# Patient Record
Sex: FEMALE | Race: BLACK OR AFRICAN AMERICAN | NOT HISPANIC OR LATINO | Employment: FULL TIME | URBAN - METROPOLITAN AREA
[De-identification: names, ages, dates, MRNs, and addresses within clinical notes are randomized per-mention and may not be internally consistent; named-entity substitution may affect disease eponyms.]

---

## 2018-09-28 ENCOUNTER — OFFICE VISIT (OUTPATIENT)
Dept: PULMONOLOGY | Facility: CLINIC | Age: 43
End: 2018-09-28
Payer: COMMERCIAL

## 2018-09-28 ENCOUNTER — HOSPITAL ENCOUNTER (OUTPATIENT)
Dept: RADIOLOGY | Facility: HOSPITAL | Age: 43
Discharge: HOME | End: 2018-09-28
Attending: INTERNAL MEDICINE
Payer: COMMERCIAL

## 2018-09-28 VITALS
WEIGHT: 228.38 LBS | HEIGHT: 71 IN | SYSTOLIC BLOOD PRESSURE: 130 MMHG | RESPIRATION RATE: 18 BRPM | DIASTOLIC BLOOD PRESSURE: 80 MMHG | BODY MASS INDEX: 31.97 KG/M2 | OXYGEN SATURATION: 98 % | HEART RATE: 77 BPM

## 2018-09-28 DIAGNOSIS — Z91.89 AT RISK FOR OPPORTUNISTIC INFECTIONS: ICD-10-CM

## 2018-09-28 DIAGNOSIS — Z79.52 CURRENT CHRONIC USE OF SYSTEMIC STEROIDS: ICD-10-CM

## 2018-09-28 DIAGNOSIS — D86.9 SARCOIDOSIS: Primary | ICD-10-CM

## 2018-09-28 DIAGNOSIS — D86.9 SARCOIDOSIS: ICD-10-CM

## 2018-09-28 DIAGNOSIS — R22.9 SUBCUTANEOUS NODULES: ICD-10-CM

## 2018-09-28 PROCEDURE — 99999 PR PBB SHADOW E&M-NEW PATIENT-LVL IV: CPT | Mod: PBBFAC,,, | Performed by: INTERNAL MEDICINE

## 2018-09-28 PROCEDURE — 71046 X-RAY EXAM CHEST 2 VIEWS: CPT | Mod: 26,,, | Performed by: RADIOLOGY

## 2018-09-28 PROCEDURE — 71046 X-RAY EXAM CHEST 2 VIEWS: CPT | Mod: TC

## 2018-09-28 PROCEDURE — 99204 OFFICE O/P NEW MOD 45 MIN: CPT | Mod: S$GLB,,, | Performed by: INTERNAL MEDICINE

## 2018-09-28 RX ORDER — PANTOPRAZOLE SODIUM 40 MG/1
40 TABLET, DELAYED RELEASE ORAL DAILY
COMMUNITY

## 2018-09-28 RX ORDER — PREDNISONE 10 MG/1
TABLET ORAL
Qty: 120 TABLET | Refills: 2 | Status: SHIPPED | OUTPATIENT
Start: 2018-09-28 | End: 2018-11-12 | Stop reason: SDUPTHER

## 2018-09-28 RX ORDER — CITALOPRAM 10 MG/1
20 TABLET ORAL
COMMUNITY
End: 2022-07-11 | Stop reason: SDUPTHER

## 2018-09-28 RX ORDER — TIZANIDINE 2 MG/1
4 TABLET ORAL EVERY 6 HOURS PRN
COMMUNITY
End: 2022-07-11

## 2018-09-28 RX ORDER — ATOVAQUONE 750 MG/5ML
750 SUSPENSION ORAL 2 TIMES DAILY
Qty: 100 ML | Refills: 3 | Status: SHIPPED | OUTPATIENT
Start: 2018-09-28 | End: 2018-10-08

## 2018-09-28 RX ORDER — LOSARTAN POTASSIUM AND HYDROCHLOROTHIAZIDE 25; 100 MG/1; MG/1
1 TABLET ORAL DAILY
COMMUNITY

## 2018-09-28 RX ORDER — ATORVASTATIN CALCIUM 20 MG/1
20 TABLET, FILM COATED ORAL DAILY
COMMUNITY

## 2018-09-28 NOTE — PROGRESS NOTES
Initial Outpatient Pulmonary Evaluation       SUBJECTIVE:     History of Present Illness:  Patient is a 42 y.o. female with known history of sarcoidosis presenting for evaluation.  Patient has had a bronchoscopy and also mediastinal lymph node biopsy apparently because she has a scar at the lower neck in 2009 and diagnosed with sarcoidosis.  She had in the past abnormal chest x-ray shortness of breath coughing and subcutaneous nodules mainly in her lower extremities she was treated with prednisone for about 4 or 5 years and as of 2014 she stopped prednisone and she was doing well.    About a month ago the patient started noticing shortness of breath, no cough no wheezing, she also noticed again subcutaneous nodules in bilateral lower extremities.      Never smoker.    Chief Complaint   Patient presents with    Sarcoidosis       Review of patient's allergies indicates:   Allergen Reactions    Sulfa (sulfonamide antibiotics) Itching and Swelling    Phenytoin sodium extended Other (See Comments)       Current Outpatient Medications   Medication Sig Dispense Refill    atorvastatin (LIPITOR) 20 MG tablet Take 20 mg by mouth once daily.      citalopram (CELEXA) 20 MG tablet Take 20 mg by mouth.      losartan-hydrochlorothiazide 100-25 mg (HYZAAR) 100-25 mg per tablet Take 1 tablet by mouth once daily.      pantoprazole (PROTONIX) 40 MG tablet Take 40 mg by mouth once daily.      tiZANidine (ZANAFLEX) 2 MG tablet Take 4 mg by mouth every 6 (six) hours as needed.      atovaquone (MEPRON) 750 mg/5 mL Susp Take 5 mLs (750 mg total) by mouth 2 (two) times daily. for 10 days 100 mL 3    predniSONE (DELTASONE) 10 MG tablet Prednisone 40 mg daily for 4 weeks 120 tablet 2     No current facility-administered medications for this visit.        Past Medical History:   Diagnosis Date    Arthritis     Cutaneous sarcoidosis     Hypertension 6/19/2013    Sarcoidosis      "Sarcoidosis of lung      Past Surgical History:   Procedure Laterality Date    mediastinoscopy       Family History   Problem Relation Age of Onset    Sarcoidosis Brother      Social History     Tobacco Use    Smoking status: Never Smoker    Smokeless tobacco: Never Used   Substance Use Topics    Alcohol use: No    Drug use: No        Review of Systems:  Review of Systems   Constitutional: Positive for fatigue.   HENT: Negative for nosebleeds.    Eyes: Negative for discharge.   Respiratory: Positive for shortness of breath. Negative for cough and wheezing.    Cardiovascular: Negative for chest pain and palpitations.   Gastrointestinal: Negative for abdominal pain.   Endocrine: Negative for polydipsia.   Genitourinary: Negative for hematuria.   Musculoskeletal: Positive for arthralgias and back pain.   Skin: Negative for wound.        Lower extremity subcutaneous nodules   Allergic/Immunologic: Positive for immunocompromised state.        Patient will be started on prednisone   Neurological: Negative for seizures.   Hematological: Positive for adenopathy.   Psychiatric/Behavioral: Negative for behavioral problems and confusion.       OBJECTIVE:     Vital Signs (Most Recent)  Pulse: 77 (09/28/18 0813)  Resp: 18 (09/28/18 0813)  BP: 130/80 (09/28/18 0813)  SpO2: 98 % (09/28/18 0813)  5' 11" (1.803 m)  103.6 kg (228 lb 6.3 oz)     Physical Exam:  Physical Exam   Constitutional: She appears well-developed and well-nourished.   HENT:   Head: Normocephalic and atraumatic.   Eyes: EOM are normal.   Neck: Neck supple.   Cardiovascular: Normal rate and regular rhythm.   Pulmonary/Chest: Effort normal. No respiratory distress. She has no wheezes. She has no rales.   Breath sound diminished mildly bilaterally   Abdominal: Soft. There is no tenderness.       Laboratory    Lab Results   Component Value Date    WBC 5.05 01/05/2012    HGB 13.7 01/05/2012    HCT 39.8 01/05/2012    MCV 86.1 01/05/2012     01/05/2012 "     BMP  Lab Results   Component Value Date     01/05/2012    K 4.1 01/05/2012     01/05/2012    CO2 27 01/05/2012    BUN 13 01/05/2012    CREATININE 0.8 01/05/2012    CALCIUM 8.6 (L) 01/05/2012    ANIONGAP 6.0 (L) 01/05/2012    ESTGFRAFRICA >60 01/05/2012    EGFRNONAA >60 01/05/2012     BNP  @LABRCNTIP(BNP,BNPTRIAGEBLO)@  No results found for: TSH  ABG  @LABRCNTIP(PH,PO2,PCO2,HCO3,BE)@     Diagnostic Results:  X-Ray: Reviewed  Chest x-ray 2013 personally reviewed normal lungs.      Ace level in May 2018 was 54, Ace level in September 2018 was 81.    6 min walk test 2013 showed mild reduction of exercise capacity, no significant desaturation    PFT 2013 personally reviewed within normal.      ASSESSMENT/PLAN:     1. Sarcoidosis    2. Subcutaneous nodules    3. At risk for opportunistic infections    4. Current chronic use of systemic steroids        Start prednisone 40 mg daily for 4 weeks.    Patient has allergy to Bactrim with itching and swelling in her mouth, will start Atovaquone 750 mg twice daily with meals.    Check chest x-ray, PFT with bronchodilator, 6 min walking test.  Check ESR    Referred to Rheumatology.      +++ Hx lung aspergillus   Follow-up in about 4 weeks (around 10/26/2018).    This note was prepared using voice recognition system and is likely to have sound alike errors that may have been overlooked even after proof reading.  Please call me with any questions    Discussed diagnosis, its evaluation, treatment and usual course. All questions answered.    Thank you for the courtesy of participating in the care of this patient    Alexander Cheng MD

## 2018-09-29 ENCOUNTER — PATIENT MESSAGE (OUTPATIENT)
Dept: PULMONOLOGY | Facility: CLINIC | Age: 43
End: 2018-09-29

## 2018-10-03 ENCOUNTER — INITIAL CONSULT (OUTPATIENT)
Dept: RHEUMATOLOGY | Facility: CLINIC | Age: 43
End: 2018-10-03
Payer: COMMERCIAL

## 2018-10-03 ENCOUNTER — LAB VISIT (OUTPATIENT)
Dept: LAB | Facility: HOSPITAL | Age: 43
End: 2018-10-03
Attending: STUDENT IN AN ORGANIZED HEALTH CARE EDUCATION/TRAINING PROGRAM
Payer: COMMERCIAL

## 2018-10-03 VITALS
DIASTOLIC BLOOD PRESSURE: 80 MMHG | BODY MASS INDEX: 31.82 KG/M2 | WEIGHT: 227.31 LBS | HEIGHT: 71 IN | HEART RATE: 83 BPM | SYSTOLIC BLOOD PRESSURE: 118 MMHG

## 2018-10-03 DIAGNOSIS — D86.9 SARCOID: Primary | ICD-10-CM

## 2018-10-03 DIAGNOSIS — D86.9 SARCOID: ICD-10-CM

## 2018-10-03 LAB
ALBUMIN SERPL BCP-MCNC: 3.6 G/DL
ALP SERPL-CCNC: 86 U/L
ALT SERPL W/O P-5'-P-CCNC: 18 U/L
ANION GAP SERPL CALC-SCNC: 10 MMOL/L
AST SERPL-CCNC: 18 U/L
BASOPHILS # BLD AUTO: 0.02 K/UL
BASOPHILS NFR BLD: 0.4 %
BILIRUB SERPL-MCNC: 0.7 MG/DL
BUN SERPL-MCNC: 19 MG/DL
CALCIUM SERPL-MCNC: 9.4 MG/DL
CHLORIDE SERPL-SCNC: 106 MMOL/L
CO2 SERPL-SCNC: 24 MMOL/L
CREAT SERPL-MCNC: 1 MG/DL
CRP SERPL-MCNC: 4.2 MG/L
DIFFERENTIAL METHOD: ABNORMAL
EOSINOPHIL # BLD AUTO: 0 K/UL
EOSINOPHIL NFR BLD: 0.6 %
ERYTHROCYTE [DISTWIDTH] IN BLOOD BY AUTOMATED COUNT: 12.2 %
ERYTHROCYTE [SEDIMENTATION RATE] IN BLOOD BY WESTERGREN METHOD: 17 MM/HR
EST. GFR  (AFRICAN AMERICAN): >60 ML/MIN/1.73 M^2
EST. GFR  (NON AFRICAN AMERICAN): >60 ML/MIN/1.73 M^2
GLUCOSE SERPL-MCNC: 107 MG/DL
HCT VFR BLD AUTO: 36.8 %
HGB BLD-MCNC: 12.9 G/DL
LYMPHOCYTES # BLD AUTO: 1.1 K/UL
LYMPHOCYTES NFR BLD: 22.9 %
MCH RBC QN AUTO: 29.8 PG
MCHC RBC AUTO-ENTMCNC: 35.1 G/DL
MCV RBC AUTO: 85 FL
MONOCYTES # BLD AUTO: 0.5 K/UL
MONOCYTES NFR BLD: 11.5 %
NEUTROPHILS # BLD AUTO: 3 K/UL
NEUTROPHILS NFR BLD: 64.6 %
PLATELET # BLD AUTO: 224 K/UL
PMV BLD AUTO: 10.9 FL
POTASSIUM SERPL-SCNC: 3.1 MMOL/L
PROT SERPL-MCNC: 7.1 G/DL
RBC # BLD AUTO: 4.33 M/UL
SODIUM SERPL-SCNC: 140 MMOL/L
WBC # BLD AUTO: 4.71 K/UL

## 2018-10-03 PROCEDURE — 80074 ACUTE HEPATITIS PANEL: CPT

## 2018-10-03 PROCEDURE — 99999 PR PBB SHADOW E&M-EST. PATIENT-LVL III: CPT | Mod: PBBFAC,,, | Performed by: STUDENT IN AN ORGANIZED HEALTH CARE EDUCATION/TRAINING PROGRAM

## 2018-10-03 PROCEDURE — 80053 COMPREHEN METABOLIC PANEL: CPT

## 2018-10-03 PROCEDURE — 99204 OFFICE O/P NEW MOD 45 MIN: CPT | Mod: S$GLB,,, | Performed by: STUDENT IN AN ORGANIZED HEALTH CARE EDUCATION/TRAINING PROGRAM

## 2018-10-03 PROCEDURE — 36415 COLL VENOUS BLD VENIPUNCTURE: CPT | Mod: PO

## 2018-10-03 PROCEDURE — 85025 COMPLETE CBC W/AUTO DIFF WBC: CPT | Mod: PO

## 2018-10-03 PROCEDURE — 85651 RBC SED RATE NONAUTOMATED: CPT | Mod: PO

## 2018-10-03 PROCEDURE — 86140 C-REACTIVE PROTEIN: CPT

## 2018-10-03 NOTE — PROGRESS NOTES
"     RHEUMATOLOGY OUTPATIENT CLINIC NOTE    10/3/2018    Attending Rheumatologist: Susanne Villalobos  Primary Care Provider: Primary Doctor No   Physician Requesting Consultation: Alexander Cheng MD  [address] [place] [address]  Chief Complaint/Reason For Consultation:  sarcoid    Subjective:       HPI  Henrietta Parada is a 43 y.o. Black or  female with     -Initially presented with sob and cough. Hx aspergillosus. Abnormal cxr led to bronchoscopy w diagnostic mediastinal lymph node biopsy consistent w sarcoid. Also had subcutaneous nodules on LE mostly, but also on forearms.   -Treated with prednisone approx 5 years, off since 2014.   -Was doing well until approx 1 month ago when she started developing sob and subcutaneous nodules in lower extremities. Also developed nodules on tattoos which is new.   -Previously tried mtx: stopped 2.2 "developed nodules in lungs?" Does not recall how long she had been on or how many tabs.  -Recently evaluated by cardiology at [facility] 2.2 new onset palpitations, chest pain, sob. EKG and echo done per pt, results unknown.   -No hx uveitis, numbness/tingling, fevers, weightloss, night sweats, cough, abdominal pain, swollen joints.   -+Dry eyes    -14pt ros negative except as otherwise stated above      Review of Systems   All other systems reviewed and are negative.      Chronic comorbid conditions affecting medical decision making today:  Past Medical History:   Diagnosis Date    Arthritis     Cutaneous sarcoidosis     Hypertension 6/19/2013    Sarcoidosis     Sarcoidosis of lung      Past Surgical History:   Procedure Laterality Date    mediastinoscopy       Family History   Problem Relation Age of Onset    Sarcoidosis Brother      Social History     Substance and Sexual Activity   Alcohol Use No     Social History     Tobacco Use   Smoking Status Never Smoker   Smokeless Tobacco Never Used     Social History     Substance and Sexual " Activity   Drug Use No       Current Outpatient Medications:     atorvastatin (LIPITOR) 20 MG tablet, Take 20 mg by mouth once daily., Disp: , Rfl:     atovaquone (MEPRON) 750 mg/5 mL Susp, Take 5 mLs (750 mg total) by mouth 2 (two) times daily. for 10 days, Disp: 100 mL, Rfl: 3    citalopram (CELEXA) 20 MG tablet, Take 20 mg by mouth., Disp: , Rfl:     losartan-hydrochlorothiazide 100-25 mg (HYZAAR) 100-25 mg per tablet, Take 1 tablet by mouth once daily., Disp: , Rfl:     pantoprazole (PROTONIX) 40 MG tablet, Take 40 mg by mouth once daily., Disp: , Rfl:     predniSONE (DELTASONE) 10 MG tablet, Prednisone 40 mg daily for 4 weeks, Disp: 120 tablet, Rfl: 2    tiZANidine (ZANAFLEX) 2 MG tablet, Take 4 mg by mouth every 6 (six) hours as needed., Disp: , Rfl:        Objective:         There were no vitals filed for this visit.  Physical Exam   Constitutional: She is oriented to person, place, and time and well-developed, well-nourished, and in no distress. No distress.   HENT:   Head: Normocephalic and atraumatic.   Eyes: Conjunctivae and EOM are normal. Pupils are equal, round, and reactive to light.   Mild lacrimal enlargement   Neck: Normal range of motion. Neck supple. No thyromegaly present.   Cardiovascular: Normal rate, regular rhythm and normal heart sounds.    Pulmonary/Chest: Effort normal and breath sounds normal. No respiratory distress.   Neurological: She is alert and oriented to person, place, and time. Gait normal.   Skin: Skin is warm and dry.     Scattered nodules across shins, .5cm x .5cm. +sxcoritations.   Small nodules along tattoo ink on left upper arm       Musculoskeletal: Normal range of motion. She exhibits no edema or tenderness.         Reviewed old and all outside pertinent medical records available.    All lab results personally reviewed and interpreted by me.  Lab Results   Component Value Date    WBC 5.05 01/05/2012    HGB 13.7 01/05/2012    HCT 39.8 01/05/2012    MCV 86.1  01/05/2012    MCH 29.7 01/05/2012    MCHC 34.4 01/05/2012    RDW 12.4 01/05/2012     01/05/2012    MPV 12.6 01/05/2012    PLTEST Adequate 05/21/2010       Lab Results   Component Value Date     01/05/2012    K 4.1 01/05/2012     01/05/2012    CO2 27 01/05/2012    GLU 85 01/05/2012    BUN 13 01/05/2012    CALCIUM 8.6 (L) 01/05/2012    PROT 6.8 01/05/2012    ALBUMIN 3.6 01/05/2012    BILITOT 0.4 01/05/2012    AST 15 01/05/2012    ALKPHOS 74 01/05/2012    ALT 16 01/05/2012       No results found for: COLORU, APPEARANCEUA, SPECGRAV, PHUR, PROTEINUA, GLUCOSEU, KETONESU, BLOODU, LEUKOCYTESUR, NITRITE, UROBILINOGEN    No results found for: CRP    Lab Results   Component Value Date    SEDRATE 21 (H) 09/28/2018       Lab Results   Component Value Date    SEDRATE 21 (H) 09/28/2018       No components found for: 25OHVITDTOT, 98SXDGBI1, 81DQFNIE3, METHODNOTE    No results found for: URICACID    No components found for: TSPOTTB    ESR 21  ACE 73    Imaging:  All imaging reviewed and independently  interpreted by me.  CXR 9/28/18  FINDINGS:  The lungs are clear and free of infiltrate.  No pleural effusion or pneumothorax. The heart is not enlarged.      Impression       1.  No acute cardiopulmonary process.          ASSESSMENT / PLAN:     Henrietta Parada is a 43 y.o. Black or  female with:    1. Sarcoid  -Initially diagnosed around 2009 via mediastinal lymph node biopsy c/w sarcoid  -Hx pulmonary and cutaneous involvement ; treated successfully w prednisone x 5 years, off since 2014  -Hx mtx: reports stopped after she began developing nodules (?)   -Now again w sob + cutaneous nodules x 1 month as before, as well as new nodules along tattoo , which she did not develop previously  -Evaluated by pulm, cxr not suggestive of active sarcoid. Has upcoming PFTs anf f/u appt 10/12/18  -Currently being evaluated by cardiology at [facility] 2.2 new onset palpitations and chest pain. Will get records of EKG for  evidence of arrythmias. Will need evaluation of possible cardiac involvement and assess need for cardiac MRI, given likely active cutaneous involvement  -Refer to dermatology for cutaneous lesions   -Currently on prednisone 40mg per pulmonary until rtc. Reports lesions improving. At this point, appears limited cutaneous involvement.   -Further escalation of therapy / initiation systemic steoid sparing agent (ie plaquenil) will depend on organ involvement vs limited cutaneous involvement. Reassess at rtc    There are no diagnoses linked to this encounter.    . Other specified counseling  - Immunization counseling done  - Weight loss counseling done  - Nutrition and exercise counseling  - Medication counseling provided      F/u 6 weeks    Method of contact with patient concerns: Rj attn Rheumatology      Susanne Villalobos M.D.  Rheumatology Department   [facility] - [address] [address]  Phone: [phone]  Fax: [phone]

## 2018-10-03 NOTE — LETTER
October 3, 2018      Alexander Cheng MD  [address] [place] [address] [address] [place] [address]           [facility] - Rheumatology  [address] [place] [address]  Phone: [phone]  Fax: [phone]          Patient: Henrietta Parada   MR Number: [id number]   YOB: 1975   Date of Visit: 10/3/2018       Dear Dr. Alexander Cheng:    Thank you for referring Henrietta Parada to me for evaluation. Attached you will find relevant portions of my assessment and plan of care.    If you have questions, please do not hesitate to call me. I look forward to following Henrietta Parada along with you.    Sincerely,    Susanne Villalobos MD    Enclosure  CC:  No Recipients    If you would like to receive this communication electronically, please contact [email or web address] or [phone] to request more information on [employer] access.    For providers and/or their staff who would like to refer a patient to [facility], please contact us through our one-stop-shop provider referral line, [facility], at [phone].    If you feel you have received this communication in error or would no longer like to receive these types of communications, please e-mail [email or web address]

## 2018-10-04 LAB
HAV IGM SERPL QL IA: NEGATIVE
HBV CORE IGM SERPL QL IA: NEGATIVE
HBV SURFACE AG SERPL QL IA: NEGATIVE
HCV AB SERPL QL IA: NEGATIVE

## 2018-10-08 ENCOUNTER — PATIENT MESSAGE (OUTPATIENT)
Dept: RHEUMATOLOGY | Facility: CLINIC | Age: 43
End: 2018-10-08

## 2018-10-08 ENCOUNTER — TELEPHONE (OUTPATIENT)
Dept: RHEUMATOLOGY | Facility: CLINIC | Age: 43
End: 2018-10-08

## 2018-10-08 NOTE — TELEPHONE ENCOUNTER
Called pt as requested in pt portal msgs, Pt stated Dr. Villalobos wanted her to see Cardiologist ASAP but first available 10/26/18 per pt. Advised to keep appt and get on wait list for earlier appt,  if possible. Pt verbalized understanding

## 2018-10-08 NOTE — TELEPHONE ENCOUNTER
Spoke with pt and she states that she received a call earlier from Dr. MARIN's nurse and everything was taken care of.

## 2018-10-18 ENCOUNTER — PATIENT MESSAGE (OUTPATIENT)
Dept: PULMONOLOGY | Facility: CLINIC | Age: 43
End: 2018-10-18

## 2018-10-18 ENCOUNTER — PATIENT MESSAGE (OUTPATIENT)
Dept: RHEUMATOLOGY | Facility: CLINIC | Age: 43
End: 2018-10-18

## 2018-11-05 ENCOUNTER — PATIENT MESSAGE (OUTPATIENT)
Dept: PULMONOLOGY | Facility: CLINIC | Age: 43
End: 2018-11-05

## 2018-11-12 ENCOUNTER — OFFICE VISIT (OUTPATIENT)
Dept: PULMONOLOGY | Facility: CLINIC | Age: 43
End: 2018-11-12
Payer: COMMERCIAL

## 2018-11-12 ENCOUNTER — PATIENT MESSAGE (OUTPATIENT)
Dept: RHEUMATOLOGY | Facility: CLINIC | Age: 43
End: 2018-11-12

## 2018-11-12 ENCOUNTER — CLINICAL SUPPORT (OUTPATIENT)
Dept: PULMONOLOGY | Facility: CLINIC | Age: 43
End: 2018-11-12
Payer: COMMERCIAL

## 2018-11-12 VITALS
OXYGEN SATURATION: 98 % | HEART RATE: 76 BPM | WEIGHT: 235 LBS | DIASTOLIC BLOOD PRESSURE: 92 MMHG | RESPIRATION RATE: 18 BRPM | HEIGHT: 71 IN | BODY MASS INDEX: 32.9 KG/M2 | SYSTOLIC BLOOD PRESSURE: 140 MMHG

## 2018-11-12 DIAGNOSIS — Z91.89 AT RISK FOR OPPORTUNISTIC INFECTIONS: ICD-10-CM

## 2018-11-12 DIAGNOSIS — D86.9 SARCOIDOSIS: Primary | ICD-10-CM

## 2018-11-12 DIAGNOSIS — Z79.52 CURRENT CHRONIC USE OF SYSTEMIC STEROIDS: ICD-10-CM

## 2018-11-12 DIAGNOSIS — R22.9 SUBCUTANEOUS NODULES: ICD-10-CM

## 2018-11-12 DIAGNOSIS — D86.9 SARCOIDOSIS: ICD-10-CM

## 2018-11-12 DIAGNOSIS — Z23 NEED FOR VACCINATION WITH 13-POLYVALENT PNEUMOCOCCAL CONJUGATE VACCINE: ICD-10-CM

## 2018-11-12 PROCEDURE — 90670 PCV13 VACCINE IM: CPT | Mod: S$GLB,,, | Performed by: INTERNAL MEDICINE

## 2018-11-12 PROCEDURE — 99999 PR PBB SHADOW E&M-EST. PATIENT-LVL III: CPT | Mod: PBBFAC,,, | Performed by: INTERNAL MEDICINE

## 2018-11-12 PROCEDURE — 94060 EVALUATION OF WHEEZING: CPT | Mod: S$GLB,,, | Performed by: INTERNAL MEDICINE

## 2018-11-12 PROCEDURE — 99214 OFFICE O/P EST MOD 30 MIN: CPT | Mod: 25,S$GLB,, | Performed by: INTERNAL MEDICINE

## 2018-11-12 PROCEDURE — 90471 IMMUNIZATION ADMIN: CPT | Mod: S$GLB,,, | Performed by: INTERNAL MEDICINE

## 2018-11-12 RX ORDER — MELOXICAM 15 MG/1
15 TABLET ORAL DAILY
Refills: 3 | COMMUNITY
Start: 2018-10-24 | End: 2022-11-29

## 2018-11-12 RX ORDER — PREDNISONE 10 MG/1
TABLET ORAL
Qty: 120 TABLET | Refills: 2 | Status: SHIPPED | OUTPATIENT
Start: 2018-11-12 | End: 2020-07-20

## 2018-11-12 RX ORDER — ATOVAQUONE 750 MG/5ML
750 SUSPENSION ORAL 2 TIMES DAILY
Qty: 210 ML | Refills: 1 | Status: SHIPPED | OUTPATIENT
Start: 2018-11-12 | End: 2018-12-20

## 2018-11-12 NOTE — PROGRESS NOTES
Pulmonary Outpatient Follow Up Visit     Subjective:      Patient ID: Henrietta Parada is a 43 y.o. female.    Chief Complaint: Sarcoidosis    HPI   43-year-old female patient presenting for 4 weeks follow-up.    Denies coughing denies wheezing denies chest pain.  Today she states she does not have shortness of breath.    Her lower extremity subcutaneous nodules have resolved 2 weeks after starting her on prednisone.  She was initially started on 40 mg daily but complained of tremor I decreased the dosage  to 30 mg daily.    She is following with Dr. GILLIAM in [facility], she has a cardiac MRI to rule out cardiac sarcoidosis scheduled November 2018.    About a month prior to her initial presentation to see me, the patient started noticing shortness of breath, no cough no wheezing, she also noticed again subcutaneous nodules in bilateral lower extremities.    She has had a bronchoscopy and also mediastinal lymph node biopsy apparently because she has a scar at the lower neck in 2009 and diagnosed with sarcoidosis.  She had in the past abnormal chest x-ray, shortness of breath , cough and subcutaneous nodules mainly in her lower extremities and she was treated with prednisone for about 4 or 5 years and as of 2014 she stopped prednisone and she was doing well.     Never smoker.    Review of Systems   Constitutional: Positive for fatigue.   HENT: Negative for nosebleeds.    Respiratory: Positive for shortness of breath. Negative for cough.    Genitourinary: Negative for hematuria.   Musculoskeletal: Positive for arthralgias and back pain.   Skin:        Lower extremity subcutaneous nodules    Gastrointestinal: Negative for abdominal pain.   Neurological: Negative for syncope.   Hematological: Positive for adenopathy.   Psychiatric/Behavioral: The patient is not nervous/anxious.        Outpatient Encounter Medications as of 11/12/2018   Medication Sig Dispense Refill     "atorvastatin (LIPITOR) 20 MG tablet Take 20 mg by mouth once daily.      citalopram (CELEXA) 20 MG tablet Take 20 mg by mouth.      losartan-hydrochlorothiazide 100-25 mg (HYZAAR) 100-25 mg per tablet Take 1 tablet by mouth once daily.      meloxicam (MOBIC) 15 MG tablet Take 15 mg by mouth once daily.  3    pantoprazole (PROTONIX) 40 MG tablet Take 40 mg by mouth once daily.      predniSONE (DELTASONE) 10 MG tablet Prednisone 20 mg daily for 6 weeks then 10 mg daily 120 tablet 2    tiZANidine (ZANAFLEX) 2 MG tablet Take 4 mg by mouth every 6 (six) hours as needed.      [DISCONTINUED] predniSONE (DELTASONE) 10 MG tablet Prednisone 40 mg daily for 4 weeks 120 tablet 2    atovaquone (MEPRON) 750 mg/5 mL Susp Take 5 mLs (750 mg total) by mouth 2 (two) times daily. 210 mL 1     No facility-administered encounter medications on file as of 11/12/2018.      Objective:     Vital Signs (Most Recent)  Vital Signs  Pulse: 76  Resp: 18  SpO2: 98 %  BP: (!) 140/92  Height and Weight  Height: 5' 11" (180.3 cm)  Weight: 106.6 kg (235 lb)  BSA (Calculated - sq m): 2.31 sq meters  BMI (Calculated): 32.8  Weight in (lb) to have BMI = 25: 178.9]  Wt Readings from Last 3 Encounters:   11/12/18 106.6 kg (235 lb)   10/03/18 103.1 kg (227 lb 4.7 oz)   09/28/18 103.6 kg (228 lb 6.3 oz)     Temp Readings from Last 3 Encounters:   No data found for Temp     Height: 5' 11" (180.3 cm)          Physical Exam   Constitutional: She is oriented to person, place, and time. She appears well-developed and well-nourished.   HENT:   Head: Normocephalic.   Neck: Neck supple.   Cardiovascular: Normal rate and regular rhythm.   Pulmonary/Chest: Normal expansion and effort normal. No respiratory distress. She has decreased breath sounds. She has no wheezes.   Abdominal: Soft.   Musculoskeletal: She exhibits no edema.   Lymphadenopathy:     She has no cervical adenopathy.   Neurological: She is alert and oriented to person, place, and time.   Skin: " Skin is warm.   Lower extremity subcutaneous nodules resolved.   Psychiatric: She has a normal mood and affect. Her behavior is normal. Judgment and thought content normal.     Laboratory    Lab Results   Component Value Date    WBC 4.71 10/03/2018    HGB 12.9 10/03/2018    HCT 36.8 (L) 10/03/2018    MCV 85 10/03/2018     10/03/2018     BMP  Lab Results   Component Value Date     10/03/2018    K 3.1 (L) 10/03/2018     10/03/2018    CO2 24 10/03/2018    BUN 19 10/03/2018    CREATININE 1.0 10/03/2018    CALCIUM 9.4 10/03/2018    ANIONGAP 10 10/03/2018    ESTGFRAFRICA >60.0 10/03/2018    EGFRNONAA >60.0 10/03/2018     ACE level in May 2018 was 54, Ace level in September 2018 was 81.  Tests were done at [facility].    CRP , ESR , HEPATITIS PANEL, QuantiFERON TB gold normal.    Diagnostic Results:  Spirometry done today personally reviewed.  No obstruction or restriction noted.  There was slight decrease of FEV1 and FVC when compared to PFT from 2013.    Chest x-ray September 28, 2018 personally reviewed    Clear lungs.    Chest x-ray 2013 personally reviewed normal lungs.     6 min walk test 2013 showed mild reduction of exercise capacity, no significant desaturation     PFT 2013 personally reviewed within normal.     Assessment/Plan:   Sarcoidosis  -     predniSONE (DELTASONE) 10 MG tablet; Prednisone 20 mg daily for 6 weeks then 10 mg daily  Dispense: 120 tablet; Refill: 2  -     Complete PFT with bronchodilator; Future; Expected date: 02/12/2019    Subcutaneous nodules    At risk for opportunistic infections  -     atovaquone (MEPRON) 750 mg/5 mL Susp; Take 5 mLs (750 mg total) by mouth 2 (two) times daily.  Dispense: 210 mL; Refill: 1    Current chronic use of systemic steroids  -     atovaquone (MEPRON) 750 mg/5 mL Susp; Take 5 mLs (750 mg total) by mouth 2 (two) times daily.  Dispense: 210 mL; Refill: 1    Need for vaccination with 13-polyvalent pneumococcal conjugate vaccine  -      Pneumococcal Conjugate Vaccine (13 Valent) (IM)      Decrease prednisone to 20 mg daily for 6 weeks then 10 mg daily until RTC.  Continue PCP prophylaxis until the patient is on less than 15 mg daily of prednisone.    Will monitor also patient weight, there has been an increase of 7 lb compared to last visit.    Will reassess the patient lung function in 3 months from now.    Will follow on the patient cardiac MRI.    Patient did not perform 6 min walking test today due to elevated diastolic blood pressure, she will monitor her blood pressure at home and follow up with PMD regarding BP control.    Follow-up in about 3 months (around 2/12/2019).    This note was prepared using voice recognition system and is likely to have sound alike errors that may have been overlooked even after proof reading.  Please call me with any questions    Discussed diagnosis, its evaluation, treatment and usual course. All questions answered.    Thank you for the courtesy of participating in the care of this patient    Alexander Cheng MD

## 2018-11-13 ENCOUNTER — PATIENT MESSAGE (OUTPATIENT)
Dept: RHEUMATOLOGY | Facility: CLINIC | Age: 43
End: 2018-11-13

## 2018-11-13 NOTE — TELEPHONE ENCOUNTER
Pt scheduled for Cardiac MRI on Friday afternoon @ [facility], and just wondering if her f/u visit is based on those results?   Would like to know so she can reschedule your appt from tomorrow to sometime next week.    Please advise,  Thx

## 2018-11-21 ENCOUNTER — LAB VISIT (OUTPATIENT)
Dept: LAB | Facility: HOSPITAL | Age: 43
End: 2018-11-21
Attending: STUDENT IN AN ORGANIZED HEALTH CARE EDUCATION/TRAINING PROGRAM
Payer: COMMERCIAL

## 2018-11-21 ENCOUNTER — PATIENT MESSAGE (OUTPATIENT)
Dept: RHEUMATOLOGY | Facility: CLINIC | Age: 43
End: 2018-11-21

## 2018-11-21 ENCOUNTER — OFFICE VISIT (OUTPATIENT)
Dept: RHEUMATOLOGY | Facility: CLINIC | Age: 43
End: 2018-11-21
Payer: COMMERCIAL

## 2018-11-21 VITALS
HEART RATE: 86 BPM | BODY MASS INDEX: 32.41 KG/M2 | WEIGHT: 231.5 LBS | DIASTOLIC BLOOD PRESSURE: 89 MMHG | SYSTOLIC BLOOD PRESSURE: 125 MMHG | HEIGHT: 71 IN

## 2018-11-21 DIAGNOSIS — D86.9 SARCOIDOSIS: ICD-10-CM

## 2018-11-21 LAB
ALBUMIN SERPL BCP-MCNC: 3.7 G/DL
ALP SERPL-CCNC: 60 U/L
ALT SERPL W/O P-5'-P-CCNC: 636 U/L
ANION GAP SERPL CALC-SCNC: 6 MMOL/L
AST SERPL-CCNC: 199 U/L
BASOPHILS # BLD AUTO: 0.03 K/UL
BASOPHILS NFR BLD: 0.4 %
BILIRUB SERPL-MCNC: 1 MG/DL
BUN SERPL-MCNC: 28 MG/DL
CALCIUM SERPL-MCNC: 9.7 MG/DL
CHLORIDE SERPL-SCNC: 101 MMOL/L
CO2 SERPL-SCNC: 34 MMOL/L
CREAT SERPL-MCNC: 1 MG/DL
CRP SERPL-MCNC: 3.2 MG/L
DIFFERENTIAL METHOD: NORMAL
EOSINOPHIL # BLD AUTO: 0 K/UL
EOSINOPHIL NFR BLD: 0.3 %
ERYTHROCYTE [DISTWIDTH] IN BLOOD BY AUTOMATED COUNT: 13.4 %
ERYTHROCYTE [SEDIMENTATION RATE] IN BLOOD BY WESTERGREN METHOD: 5 MM/HR
EST. GFR  (AFRICAN AMERICAN): >60 ML/MIN/1.73 M^2
EST. GFR  (NON AFRICAN AMERICAN): >60 ML/MIN/1.73 M^2
GLUCOSE SERPL-MCNC: 96 MG/DL
HCT VFR BLD AUTO: 43.7 %
HGB BLD-MCNC: 14.8 G/DL
LYMPHOCYTES # BLD AUTO: 2.6 K/UL
LYMPHOCYTES NFR BLD: 39.1 %
MCH RBC QN AUTO: 30.3 PG
MCHC RBC AUTO-ENTMCNC: 33.9 G/DL
MCV RBC AUTO: 90 FL
MONOCYTES # BLD AUTO: 0.8 K/UL
MONOCYTES NFR BLD: 12.2 %
NEUTROPHILS # BLD AUTO: 3.2 K/UL
NEUTROPHILS NFR BLD: 48 %
PLATELET # BLD AUTO: 209 K/UL
PMV BLD AUTO: 11.2 FL
POTASSIUM SERPL-SCNC: 3.6 MMOL/L
PROT SERPL-MCNC: 7.1 G/DL
RBC # BLD AUTO: 4.88 M/UL
SODIUM SERPL-SCNC: 141 MMOL/L
WBC # BLD AUTO: 6.73 K/UL

## 2018-11-21 PROCEDURE — 99214 OFFICE O/P EST MOD 30 MIN: CPT | Mod: S$GLB,,, | Performed by: STUDENT IN AN ORGANIZED HEALTH CARE EDUCATION/TRAINING PROGRAM

## 2018-11-21 PROCEDURE — 99999 PR PBB SHADOW E&M-EST. PATIENT-LVL III: CPT | Mod: PBBFAC,,, | Performed by: STUDENT IN AN ORGANIZED HEALTH CARE EDUCATION/TRAINING PROGRAM

## 2018-11-21 PROCEDURE — 36415 COLL VENOUS BLD VENIPUNCTURE: CPT | Mod: PO

## 2018-11-21 PROCEDURE — 85651 RBC SED RATE NONAUTOMATED: CPT | Mod: PO

## 2018-11-21 PROCEDURE — 85025 COMPLETE CBC W/AUTO DIFF WBC: CPT | Mod: PO

## 2018-11-21 PROCEDURE — 80053 COMPREHEN METABOLIC PANEL: CPT

## 2018-11-21 PROCEDURE — 86140 C-REACTIVE PROTEIN: CPT

## 2018-11-21 NOTE — PROGRESS NOTES
"     RHEUMATOLOGY OUTPATIENT CLINIC NOTE    11/21/2018    Attending Rheumatologist: Susanne Villalobos  Primary Care Provider: Primary Doctor No   Physician Requesting Consultation: No referring provider defined for this encounter.  Chief Complaint/Reason For Consultation:  sarcoid    Subjective:       HPI  Henrietta Parada is a 43 y.o. Black or  female with     Historical:    -Initially presented with sob and cough. Hx aspergillosus. Abnormal cxr led to bronchoscopy w diagnostic mediastinal lymph node biopsy consistent w sarcoid. Also had subcutaneous nodules on LE mostly, but also on forearms.   -Treated with prednisone approx 5 years, off since 2014.   -Was doing well until approx 1 month ago when she started developing sob and subcutaneous nodules in lower extremities. Also developed nodules on tattoos which is new.   -Previously tried mtx: stopped 2.2 "developed nodules in lungs?" Does not recall how long she had been on or how many tabs.  -Recently evaluated by cardiology at [facility] 2.2 new onset palpitations, chest pain, sob. EKG and echo done per pt, results unknown.   -No hx uveitis, numbness/tingling, fevers, weightloss, night sweats, cough, abdominal pain, swollen joints.   -+Dry eyes    Today:  -Since last visit 10/3/18, pt reports feeling well. Rash on upper arm tattoo resolved as well as nodules on lower extremities. Improvement and resolution since starting steroids. Has had cardiac MRI done, as well as EKG and echo- all normal per pt. SOB improved since starting steroids but still persistent. Pt gets sob with just walking to her car from parking lot. No cough/sputum production/ fevers/ chills. No new rashes. Otherwise, reports feeling well today with no complaints. Has not seen dermatology yet.   -14pt ros negative except as otherwise stated above      Review of Systems   Constitutional: Negative for chills, fever and weight loss.   HENT: Negative for ear pain, hearing loss and tinnitus.  "   Eyes: Negative for blurred vision, double vision and photophobia.   Respiratory: Positive for shortness of breath. Negative for cough, hemoptysis and sputum production.    Cardiovascular: Negative for chest pain, palpitations and orthopnea.   Gastrointestinal: Negative for heartburn, nausea and vomiting.   Genitourinary: Negative for dysuria and urgency.   Musculoskeletal: Negative for back pain, myalgias and neck pain.   Skin: Negative for itching and rash.   Neurological: Negative for dizziness, tingling and focal weakness.   Endo/Heme/Allergies: Negative for polydipsia. Does not bruise/bleed easily.   All other systems reviewed and are negative.      Chronic comorbid conditions affecting medical decision making today:  Past Medical History:   Diagnosis Date    Arthritis     Cutaneous sarcoidosis     Hypertension 6/19/2013    Sarcoidosis     Sarcoidosis of lung      Past Surgical History:   Procedure Laterality Date    mediastinoscopy       Family History   Problem Relation Age of Onset    Sarcoidosis Brother      Social History     Substance and Sexual Activity   Alcohol Use No     Social History     Tobacco Use   Smoking Status Never Smoker   Smokeless Tobacco Never Used     Social History     Substance and Sexual Activity   Drug Use No       Current Outpatient Medications:     atorvastatin (LIPITOR) 20 MG tablet, Take 20 mg by mouth once daily., Disp: , Rfl:     atovaquone (MEPRON) 750 mg/5 mL Susp, Take 5 mLs (750 mg total) by mouth 2 (two) times daily., Disp: 210 mL, Rfl: 1    citalopram (CELEXA) 20 MG tablet, Take 20 mg by mouth., Disp: , Rfl:     losartan-hydrochlorothiazide 100-25 mg (HYZAAR) 100-25 mg per tablet, Take 1 tablet by mouth once daily., Disp: , Rfl:     meloxicam (MOBIC) 15 MG tablet, Take 15 mg by mouth once daily., Disp: , Rfl: 3    pantoprazole (PROTONIX) 40 MG tablet, Take 40 mg by mouth once daily., Disp: , Rfl:     predniSONE (DELTASONE) 10 MG tablet, Prednisone 20 mg  daily for 6 weeks then 10 mg daily, Disp: 120 tablet, Rfl: 2    tiZANidine (ZANAFLEX) 2 MG tablet, Take 4 mg by mouth every 6 (six) hours as needed., Disp: , Rfl:        Objective:         There were no vitals filed for this visit.  Physical Exam   Constitutional: She is oriented to person, place, and time and well-developed, well-nourished, and in no distress. No distress.   HENT:   Head: Normocephalic and atraumatic.   Eyes: Conjunctivae and EOM are normal. Pupils are equal, round, and reactive to light.   Mild lacrimal enlargement   Neck: Normal range of motion. Neck supple. No thyromegaly present.   Cardiovascular: Normal rate, regular rhythm and normal heart sounds.    Pulmonary/Chest: Effort normal and breath sounds normal. No respiratory distress.   Neurological: She is alert and oriented to person, place, and time. Gait normal.   Skin: Skin is warm and dry.     No nodules on lower extremities  No rash on tattoo.      Musculoskeletal: Normal range of motion. She exhibits no edema or tenderness.         Reviewed old and all outside pertinent medical records available.    All lab results personally reviewed and interpreted by me.  Lab Results   Component Value Date    WBC 4.71 10/03/2018    HGB 12.9 10/03/2018    HCT 36.8 (L) 10/03/2018    MCV 85 10/03/2018    MCH 29.8 10/03/2018    MCHC 35.1 10/03/2018    RDW 12.2 10/03/2018     10/03/2018    MPV 10.9 10/03/2018    PLTEST Adequate 05/21/2010       Lab Results   Component Value Date     10/03/2018    K 3.1 (L) 10/03/2018     10/03/2018    CO2 24 10/03/2018     10/03/2018    BUN 19 10/03/2018    CALCIUM 9.4 10/03/2018    PROT 7.1 10/03/2018    ALBUMIN 3.6 10/03/2018    BILITOT 0.7 10/03/2018    AST 18 10/03/2018    ALKPHOS 86 10/03/2018    ALT 18 10/03/2018       No results found for: COLORU, APPEARANCEUA, SPECGRAV, PHUR, PROTEINUA, GLUCOSEU, KETONESU, BLOODU, LEUKOCYTESUR, NITRITE, UROBILINOGEN    Lab Results   Component Value Date     CRP 4.2 10/03/2018       Lab Results   Component Value Date    SEDRATE 17 10/03/2018       Lab Results   Component Value Date    SEDRATE 17 10/03/2018       No components found for: 25OHVITDTOT, 97SHFVRS5, 64HJREYQ6, METHODNOTE    No results found for: URICACID    No components found for: TSPOTTB    ESR 21  ACE 73  TB gold -  Acute hep -  APR wnl      Imaging:  All imaging reviewed and independently  interpreted by me.  CXR 9/28/18  FINDINGS:  The lungs are clear and free of infiltrate.  No pleural effusion or pneumothorax. The heart is not enlarged.      Impression       1.  No acute cardiopulmonary process.     PFT 11/13/18  FEV1/FVC normal at 78%  FVC 99%  No significant bronchodilation noted on bronchodilator administration.     Flow volume curve within normal.     Spirometry within normal.         ASSESSMENT / PLAN:     Henrietta Parada is a 43 y.o. Black or  female with:    1. Sarcoid  -Initially diagnosed around 2009 via mediastinal lymph node biopsy c/w sarcoid  -Hx pulmonary and cutaneous involvement ; treated successfully w prednisone x 5 years, off since 2014  -Hx mtx: reports stopped after she began developing nodules (?)   -Recurrence of sob + cutaneous nodules x 2 month as before, as well as new nodules along tattoo , which she did not develop previously  -Evaluated by pulm, cxr not suggestive of active sarcoid. PFT wnl. Currently on prednisone 20mg daily, tolerating taper without worsening sob   -Evaluated by cardiology at [facility] 2.2 new onset palpitations and chest pain.  No evidence on  EKG of arythmias. Cardiac MRI done- no evidence sarcoid involvement.   -Has not seen dermatology yet, has upcoming appt for evaluation of cutaneous lesions (although have since resolved w prednisone therapy)  -Will refer to opthamology for evaluation possible eye involvement, given lacrimal enlargement and dry eyes   -Further escalation of therapy / initiation systemic steroid sparing agent (ie  plaquenil) will depend on organ involvement vs limited cutaneous involvement.  Reassess at rtc        . Other specified counseling  - Immunization counseling done  - Weight loss counseling done  - Nutrition and exercise counseling  - Medication counseling provided      F/u 4 weeks    Method of contact with patient concerns: [facility]      Susanne Villalobos M.D.  Rheumatology Department   [facility] [place] [address], [place] [address]  Phone: [phone]  Fax: [phone]

## 2018-11-23 ENCOUNTER — PATIENT MESSAGE (OUTPATIENT)
Dept: RHEUMATOLOGY | Facility: CLINIC | Age: 43
End: 2018-11-23

## 2018-11-23 ENCOUNTER — TELEPHONE (OUTPATIENT)
Dept: RHEUMATOLOGY | Facility: CLINIC | Age: 43
End: 2018-11-23

## 2018-11-23 DIAGNOSIS — R74.01 TRANSAMINITIS: Primary | ICD-10-CM

## 2018-11-23 NOTE — TELEPHONE ENCOUNTER
Called pt to discuss results. Transaminitis on recent labs. Concern for sarciod involvement. Will repeat and get liver ultrasound. Refer to GI. Currently on prednisone 20mg, otherwise feels well without complaints , no new symptoms, no worsening previous rash or sob

## 2018-11-26 ENCOUNTER — TELEPHONE (OUTPATIENT)
Dept: RHEUMATOLOGY | Facility: CLINIC | Age: 43
End: 2018-11-26

## 2018-11-26 ENCOUNTER — PATIENT MESSAGE (OUTPATIENT)
Dept: RHEUMATOLOGY | Facility: CLINIC | Age: 43
End: 2018-11-26

## 2018-11-26 NOTE — TELEPHONE ENCOUNTER
Called pt back advising that Dr. Villalobos says she does not need to be seen today, will see her after results of CT and ultrasound. Pt stated had AST/ALT redone Sat at job and sending those results thru portal.

## 2018-11-27 ENCOUNTER — TELEPHONE (OUTPATIENT)
Dept: RHEUMATOLOGY | Facility: CLINIC | Age: 43
End: 2018-11-27

## 2018-11-28 ENCOUNTER — HOSPITAL ENCOUNTER (OUTPATIENT)
Dept: RADIOLOGY | Facility: HOSPITAL | Age: 43
Discharge: HOME | End: 2018-11-28
Attending: STUDENT IN AN ORGANIZED HEALTH CARE EDUCATION/TRAINING PROGRAM
Payer: COMMERCIAL

## 2018-11-28 DIAGNOSIS — D86.9 SARCOIDOSIS: ICD-10-CM

## 2018-11-28 DIAGNOSIS — R74.01 TRANSAMINITIS: ICD-10-CM

## 2018-11-28 PROCEDURE — 71250 CT THORAX DX C-: CPT | Mod: TC,PO

## 2018-11-28 PROCEDURE — 76705 ECHO EXAM OF ABDOMEN: CPT | Mod: 26,,, | Performed by: RADIOLOGY

## 2018-11-28 PROCEDURE — 71250 CT THORAX DX C-: CPT | Mod: 26,,, | Performed by: RADIOLOGY

## 2018-11-28 PROCEDURE — 76705 ECHO EXAM OF ABDOMEN: CPT | Mod: TC,PO

## 2018-11-29 ENCOUNTER — PATIENT MESSAGE (OUTPATIENT)
Dept: RHEUMATOLOGY | Facility: CLINIC | Age: 43
End: 2018-11-29

## 2018-11-30 ENCOUNTER — PATIENT MESSAGE (OUTPATIENT)
Dept: RHEUMATOLOGY | Facility: CLINIC | Age: 43
End: 2018-11-30

## 2018-12-06 ENCOUNTER — PATIENT MESSAGE (OUTPATIENT)
Dept: RHEUMATOLOGY | Facility: CLINIC | Age: 43
End: 2018-12-06

## 2018-12-10 ENCOUNTER — PATIENT MESSAGE (OUTPATIENT)
Dept: RHEUMATOLOGY | Facility: CLINIC | Age: 43
End: 2018-12-10

## 2018-12-11 ENCOUNTER — OFFICE VISIT (OUTPATIENT)
Dept: OPHTHALMOLOGY | Facility: CLINIC | Age: 43
End: 2018-12-11
Payer: COMMERCIAL

## 2018-12-11 DIAGNOSIS — H04.129 DRY EYE: Primary | ICD-10-CM

## 2018-12-11 DIAGNOSIS — D86.9 SARCOIDOSIS: ICD-10-CM

## 2018-12-11 PROCEDURE — 99999 PR PBB SHADOW E&M-EST. PATIENT-LVL II: CPT | Mod: PBBFAC,,, | Performed by: OPHTHALMOLOGY

## 2018-12-11 PROCEDURE — 92004 COMPRE OPH EXAM NEW PT 1/>: CPT | Mod: S$GLB,,, | Performed by: OPHTHALMOLOGY

## 2018-12-11 RX ORDER — GABAPENTIN 300 MG/1
300 CAPSULE ORAL 3 TIMES DAILY
COMMUNITY

## 2018-12-11 NOTE — PROGRESS NOTES
SUBJECTIVE:   Henrietta Parada is a 43 y.o. female   Corrected distance visual acuity was 20/25 in the right eye and 20/20 in the left eye.   Chief Complaint   Patient presents with    Sarcoidosis     NP referred by Rheumatologist        HPI:  HPI     Sarcoidosis      Additional comments: NP referred by Rheumatologist              Comments     Pt was referred by her rheumatologist for possible swelling behind her   eyes. Her last eye exam was a few months ago, found her eyes to be very   dry. She only use tears prn. No pain or irritation in the eyes. Currently   taking 20mg Prednisone daily for sarcoidosis.     Referred by Rheumatologist Dr. Susanne Morris  1. Sarcoidosis since [id number]  2. Dry eyes    Prednisone 20mg daily since late September  Was previously on 40mg          Last edited by Cristobal Little on 12/11/2018  9:05 AM. (History)        Assessment /Plan :  1. Dry eye mild, recommend artificial tears prn       2. Sarcoidosis no ocular involvement       RTC in 1 year or prn any changes

## 2018-12-20 ENCOUNTER — OFFICE VISIT (OUTPATIENT)
Dept: GASTROENTEROLOGY | Facility: CLINIC | Age: 43
End: 2018-12-20
Payer: COMMERCIAL

## 2018-12-20 ENCOUNTER — LAB VISIT (OUTPATIENT)
Dept: LAB | Facility: HOSPITAL | Age: 43
End: 2018-12-20
Attending: NURSE PRACTITIONER
Payer: COMMERCIAL

## 2018-12-20 VITALS
WEIGHT: 235 LBS | DIASTOLIC BLOOD PRESSURE: 100 MMHG | HEART RATE: 88 BPM | SYSTOLIC BLOOD PRESSURE: 142 MMHG | BODY MASS INDEX: 32.9 KG/M2 | HEIGHT: 71 IN

## 2018-12-20 DIAGNOSIS — R74.8 ELEVATED LIVER ENZYMES: ICD-10-CM

## 2018-12-20 DIAGNOSIS — R74.8 ELEVATED LIVER ENZYMES: Primary | ICD-10-CM

## 2018-12-20 DIAGNOSIS — D86.9 SARCOIDOSIS: ICD-10-CM

## 2018-12-20 LAB
ALBUMIN SERPL BCP-MCNC: 3.5 G/DL
ALP SERPL-CCNC: 67 U/L
ALT SERPL W/O P-5'-P-CCNC: 39 U/L
ANION GAP SERPL CALC-SCNC: 9 MMOL/L
AST SERPL-CCNC: 18 U/L
BILIRUB DIRECT SERPL-MCNC: 0.3 MG/DL
BILIRUB SERPL-MCNC: 0.7 MG/DL
BUN SERPL-MCNC: 21 MG/DL
CALCIUM SERPL-MCNC: 8.6 MG/DL
CHLORIDE SERPL-SCNC: 105 MMOL/L
CO2 SERPL-SCNC: 28 MMOL/L
CREAT SERPL-MCNC: 0.9 MG/DL
EST. GFR  (AFRICAN AMERICAN): >60 ML/MIN/1.73 M^2
EST. GFR  (NON AFRICAN AMERICAN): >60 ML/MIN/1.73 M^2
FERRITIN SERPL-MCNC: 413 NG/ML
GGT SERPL-CCNC: 168 U/L
GLUCOSE SERPL-MCNC: 108 MG/DL
INR PPP: 1
IRON SERPL-MCNC: 89 UG/DL
POTASSIUM SERPL-SCNC: 3.4 MMOL/L
PROT SERPL-MCNC: 6.9 G/DL
PROTHROMBIN TIME: 10.1 SEC
SATURATED IRON: 25 %
SODIUM SERPL-SCNC: 142 MMOL/L
TOTAL IRON BINDING CAPACITY: 354 UG/DL
TRANSFERRIN SERPL-MCNC: 239 MG/DL

## 2018-12-20 PROCEDURE — 36415 COLL VENOUS BLD VENIPUNCTURE: CPT | Mod: PO

## 2018-12-20 PROCEDURE — 80048 BASIC METABOLIC PNL TOTAL CA: CPT

## 2018-12-20 PROCEDURE — 82977 ASSAY OF GGT: CPT

## 2018-12-20 PROCEDURE — 86704 HEP B CORE ANTIBODY TOTAL: CPT

## 2018-12-20 PROCEDURE — 82390 ASSAY OF CERULOPLASMIN: CPT

## 2018-12-20 PROCEDURE — 80076 HEPATIC FUNCTION PANEL: CPT

## 2018-12-20 PROCEDURE — 86235 NUCLEAR ANTIGEN ANTIBODY: CPT

## 2018-12-20 PROCEDURE — 85610 PROTHROMBIN TIME: CPT | Mod: PO

## 2018-12-20 PROCEDURE — 86376 MICROSOMAL ANTIBODY EACH: CPT

## 2018-12-20 PROCEDURE — 86790 VIRUS ANTIBODY NOS: CPT

## 2018-12-20 PROCEDURE — 99999 PR PBB SHADOW E&M-EST. PATIENT-LVL III: CPT | Mod: PBBFAC,,, | Performed by: NURSE PRACTITIONER

## 2018-12-20 PROCEDURE — 83540 ASSAY OF IRON: CPT

## 2018-12-20 PROCEDURE — 86706 HEP B SURFACE ANTIBODY: CPT

## 2018-12-20 PROCEDURE — 82728 ASSAY OF FERRITIN: CPT

## 2018-12-20 PROCEDURE — 86038 ANTINUCLEAR ANTIBODIES: CPT

## 2018-12-20 PROCEDURE — 99204 OFFICE O/P NEW MOD 45 MIN: CPT | Mod: S$GLB,,, | Performed by: NURSE PRACTITIONER

## 2018-12-20 PROCEDURE — 82103 ALPHA-1-ANTITRYPSIN TOTAL: CPT

## 2018-12-20 PROCEDURE — 86256 FLUORESCENT ANTIBODY TITER: CPT | Mod: 91

## 2018-12-20 RX ORDER — CYANOCOBALAMIN (VITAMIN B-12) 500 MCG
TABLET ORAL
COMMUNITY

## 2018-12-20 NOTE — LETTER
December 21, 2018      Susanne Villalobos MD  [address] [place] [address]           [facility]  [address] [place] [address]  Phone: [phone]  Fax: [phone]          Patient: Henrietta Parada   MR Number: [id number]   YOB: 1975   Date of Visit: 12/20/2018       Dear Dr. Susanne Villalobos:    Thank you for referring Henrietta Parada to me for evaluation. Attached you will find relevant portions of my assessment and plan of care.    If you have questions, please do not hesitate to call me. I look forward to following Henrietta Parada along with you.    Sincerely,    Beatrice Wasserman, [facility]    Enclosure  CC:  No Recipients    If you would like to receive this communication electronically, please contact [email or web address] or [phone] to request more information on [employer] access.    For providers and/or their staff who would like to refer a patient to [facility], please contact us through our one-stop-shop provider referral line, [facility] , at [phone].    If you feel you have received this communication in error or would no longer like to receive these types of communications, please e-mail [email or web address]

## 2018-12-21 ENCOUNTER — PATIENT MESSAGE (OUTPATIENT)
Dept: GASTROENTEROLOGY | Facility: CLINIC | Age: 43
End: 2018-12-21

## 2018-12-21 ENCOUNTER — PATIENT MESSAGE (OUTPATIENT)
Dept: RHEUMATOLOGY | Facility: CLINIC | Age: 43
End: 2018-12-21

## 2018-12-21 LAB
A1AT SERPL-MCNC: 134 MG/DL
CERULOPLASMIN SERPL-MCNC: 27 MG/DL

## 2018-12-21 NOTE — TELEPHONE ENCOUNTER
Spoke with pt and she saw GI yesterday afternoon and completed a lot of lab work. Would like to know if she needs to wait to come in for her 4 wk follow up until all the test results are back. Please Advise.

## 2018-12-21 NOTE — PROGRESS NOTES
Clinic Consult:  [facility] Consultation Note    Reason for Consult:  The primary encounter diagnosis was Elevated liver enzymes. A diagnosis of Sarcoidosis was also pertinent to this visit.    PCP: Primary Doctor No   [address] [place] [address]    HPI:  This is a 43 y.o. female here for evaluation of the above  She was recently seen by her rheumatologist for further evaluation of a sarcoidosis flare.  Labs were completed and found a significant elevation in her liver enzymes.   She denies any known liver disease.  No known sarcoid of the liver.   She was on Mepron prior to the elevation of the LFTs, however, had stopped it a few weeks before.   She denies any other new medications or changes in medications. No OTC supplements.  No ETOH.    No abdominal pain associated with the elevation.  No fever.       Review of Systems   Constitutional: Negative for chills, fever, malaise/fatigue and weight loss.   Respiratory: Negative for cough.    Cardiovascular: Negative for chest pain.   Gastrointestinal:        Per HPI   Musculoskeletal: Negative for myalgias.   Skin: Negative for itching and rash.   Neurological: Negative for headaches.   Psychiatric/Behavioral: The patient is not nervous/anxious.        Medical History:   Past Medical History:   Diagnosis Date    Arthritis     Cutaneous sarcoidosis     Hypertension 6/19/2013    Sarcoidosis     Sarcoidosis of lung        Surgical History:  Past Surgical History:   Procedure Laterality Date    mediastinoscopy         Family History:   Family History   Problem Relation Age of Onset    Sarcoidosis Brother        Social History:   Social History     Tobacco Use    Smoking status: Never Smoker    Smokeless tobacco: Never Used   Substance Use Topics    Alcohol use: Yes     Comment: occasionally    Drug use: No       Allergies: Reviewed    Home Medications:   Current Outpatient Medications on File Prior to Visit   Medication Sig Dispense  "Refill    atorvastatin (LIPITOR) 20 MG tablet Take 20 mg by mouth once daily.      citalopram (CELEXA) 20 MG tablet Take 20 mg by mouth.      gabapentin (NEURONTIN) 300 MG capsule Take 300 mg by mouth 3 (three) times daily.      losartan-hydrochlorothiazide 100-25 mg (HYZAAR) 100-25 mg per tablet Take 1 tablet by mouth once daily.      melatonin 1 mg/4 mL Drop Take by mouth.      meloxicam (MOBIC) 15 MG tablet Take 15 mg by mouth once daily.  3    pantoprazole (PROTONIX) 40 MG tablet Take 40 mg by mouth once daily.      predniSONE (DELTASONE) 10 MG tablet Prednisone 20 mg daily for 6 weeks then 10 mg daily 120 tablet 2    tiZANidine (ZANAFLEX) 2 MG tablet Take 4 mg by mouth every 6 (six) hours as needed.       No current facility-administered medications on file prior to visit.        Physical Exam:  Vital Signs:  BP (!) 142/100   Pulse 88   Ht 5' 11" (1.803 m)   Wt 106.6 kg (235 lb 0.2 oz)   BMI 32.78 kg/m²   Body mass index is 32.78 kg/m².  Physical Exam   Constitutional: She is oriented to person, place, and time. She appears well-developed and well-nourished.   HENT:   Head: Normocephalic.   Eyes: No scleral icterus.   Neck: Normal range of motion.   Cardiovascular: Normal rate and regular rhythm.   Pulmonary/Chest: Effort normal and breath sounds normal.   Abdominal: Soft. Bowel sounds are normal. She exhibits no distension. There is no tenderness.   Musculoskeletal: Normal range of motion.   Neurological: She is alert and oriented to person, place, and time.   Skin: Skin is warm and dry.   Psychiatric: She has a normal mood and affect.   Vitals reviewed.      Labs: Pertinent labs reviewed.    Assessment:  1. Elevated liver enzymes    2. Sarcoidosis         Recommendations:  - Unclear cause of the elevated LFTs  - ? If there is sarcoid of the liver given her PMH  - Will get additional labs today for further investigation  - May need liver biopsy if labs remain abnormal   Elevated liver enzymes  -   "   Basic metabolic panel; Future; Expected date: 12/20/2018  -     Hepatic function panel; Future; Expected date: 12/20/2018  -     Protime-INR; Future; Expected date: 12/20/2018  -     Ferritin; Future; Expected date: 12/20/2018  -     Iron and TIBC; Future; Expected date: 12/20/2018  -     Hepatitis B core antibody, total; Future; Expected date: 12/20/2018  -     Hepatitis B surface antibody; Future; Expected date: 12/20/2018  -     Alpha-1-antitrypsin; Future; Expected date: 12/20/2018  -     CHEYENNE; Future; Expected date: 12/20/2018  -     Antimitochondrial antibody; Future; Expected date: 12/20/2018  -     Anti-smooth muscle antibody; Future; Expected date: 12/20/2018  -     Ceruloplasmin; Future; Expected date: 12/20/2018  -     Anti-Liver, Kidney, Microsome Ab; Future; Expected date: 12/20/2018  -     Gamma GT; Future; Expected date: 12/20/2018  -     Hepatitis A antibody, IgG; Future; Expected date: 12/20/2018    Sarcoidosis        Follow up to be determined by results of above.        Thank you so much for allowing me to participate in the care of Henrietta Jesus MARIA A Keller

## 2018-12-24 LAB
ANA SER QL IF: NORMAL
HBV CORE AB SERPL QL IA: NEGATIVE
HBV SURFACE AB SER-ACNC: POSITIVE M[IU]/ML
HEPATITIS A ANTIBODY, IGG: NEGATIVE
MITOCHONDRIA AB TITR SER IF: NORMAL {TITER}
SMOOTH MUSCLE AB TITR SER IF: NORMAL {TITER}

## 2018-12-26 LAB — LKM AB SER-ACNC: 1 UNITS

## 2018-12-27 ENCOUNTER — PATIENT MESSAGE (OUTPATIENT)
Dept: GASTROENTEROLOGY | Facility: CLINIC | Age: 43
End: 2018-12-27

## 2020-06-18 ENCOUNTER — OFFICE VISIT (OUTPATIENT)
Dept: RHEUMATOLOGY | Facility: CLINIC | Age: 45
End: 2020-06-18
Payer: COMMERCIAL

## 2020-06-18 VITALS
DIASTOLIC BLOOD PRESSURE: 117 MMHG | HEIGHT: 71 IN | SYSTOLIC BLOOD PRESSURE: 159 MMHG | WEIGHT: 229.94 LBS | BODY MASS INDEX: 32.19 KG/M2 | HEART RATE: 85 BPM

## 2020-06-18 DIAGNOSIS — R21 RASH: Primary | ICD-10-CM

## 2020-06-18 DIAGNOSIS — D86.9 SARCOIDOSIS: ICD-10-CM

## 2020-06-18 PROCEDURE — 96372 THER/PROPH/DIAG INJ SC/IM: CPT | Mod: S$GLB,,, | Performed by: STUDENT IN AN ORGANIZED HEALTH CARE EDUCATION/TRAINING PROGRAM

## 2020-06-18 PROCEDURE — 99999 PR PBB SHADOW E&M-EST. PATIENT-LVL III: CPT | Mod: PBBFAC,,, | Performed by: STUDENT IN AN ORGANIZED HEALTH CARE EDUCATION/TRAINING PROGRAM

## 2020-06-18 PROCEDURE — 99999 PR PBB SHADOW E&M-EST. PATIENT-LVL III: ICD-10-PCS | Mod: PBBFAC,,, | Performed by: STUDENT IN AN ORGANIZED HEALTH CARE EDUCATION/TRAINING PROGRAM

## 2020-06-18 PROCEDURE — 99214 PR OFFICE/OUTPT VISIT, EST, LEVL IV, 30-39 MIN: ICD-10-PCS | Mod: 25,S$GLB,, | Performed by: STUDENT IN AN ORGANIZED HEALTH CARE EDUCATION/TRAINING PROGRAM

## 2020-06-18 PROCEDURE — 96372 PR INJECTION,THERAP/PROPH/DIAG2ST, IM OR SUBCUT: ICD-10-PCS | Mod: S$GLB,,, | Performed by: STUDENT IN AN ORGANIZED HEALTH CARE EDUCATION/TRAINING PROGRAM

## 2020-06-18 PROCEDURE — 99214 OFFICE O/P EST MOD 30 MIN: CPT | Mod: 25,S$GLB,, | Performed by: STUDENT IN AN ORGANIZED HEALTH CARE EDUCATION/TRAINING PROGRAM

## 2020-06-18 RX ORDER — BETAMETHASONE SODIUM PHOSPHATE AND BETAMETHASONE ACETATE 3; 3 MG/ML; MG/ML
6 INJECTION, SUSPENSION INTRA-ARTICULAR; INTRALESIONAL; INTRAMUSCULAR; SOFT TISSUE
Status: COMPLETED | OUTPATIENT
Start: 2020-06-18 | End: 2020-06-18

## 2020-06-18 RX ORDER — PREDNISONE 5 MG/1
TABLET ORAL
Qty: 42 TABLET | Refills: 1 | Status: SHIPPED | OUTPATIENT
Start: 2020-06-18 | End: 2020-07-20

## 2020-06-18 RX ADMIN — BETAMETHASONE SODIUM PHOSPHATE AND BETAMETHASONE ACETATE 6 MG: 3; 3 INJECTION, SUSPENSION INTRA-ARTICULAR; INTRALESIONAL; INTRAMUSCULAR; SOFT TISSUE at 11:06

## 2020-06-18 NOTE — PROGRESS NOTES
Administered 1cc Betamethasone  6mg/cc to Left Ventrogluteal. Pt tolerated well. No acute reaction noted to site. Pt instructed on S/S to report. Pt verbalized understanding. Patient waited 15 minutes post injection    Lot:[id number]  Exp:2/23/2021  Manu:[employer]

## 2020-06-18 NOTE — PROGRESS NOTES
"     RHEUMATOLOGY OUTPATIENT CLINIC NOTE      Attending Rheumatologist: Susanne Villalobos  Primary Care Provider: Primary Doctor No   Physician Requesting Consultation: No referring provider defined for this encounter.  Chief Complaint/Reason For Consultation:  rash    Subjective:       HPI  Henrietta Parada is a 44 y.o. Black or  female with     Historical:    -Initially presented with sob and cough. Hx aspergillosus. Abnormal cxr led to bronchoscopy w diagnostic mediastinal lymph node biopsy consistent w sarcoid. Also had subcutaneous nodules on LE mostly, but also on forearms.   -Treated with prednisone approx 5 years, off since 2014.   -Was doing well until approx 1 month ago when she started developing sob and subcutaneous nodules in lower extremities. Also developed nodules on tattoos which is new.   -Previously tried mtx: stopped 2.2 "developed nodules in lungs?" Does not recall how long she had been on or how many tabs.  -Recently evaluated by cardiology at [facility] 2.2 new onset palpitations, chest pain, sob. EKG and echo done per pt, results unknown.   -No hx uveitis, numbness/tingling, fevers, weightloss, night sweats, cough, abdominal pain, swollen joints.   -+Dry eyes    Today:  -Since last visit 10/2018, pt reports overall having felt well. However, has had recurrence of symptoms over past several months. Diagnosed w covid-19 in 3/2020. Spent 8 days in hospital, not intubated at [facility]. Since then, reports overall feeling much better. Still w residual weakness, sob. Now c/o recurrence of sarcoid on her tattoos. Also nodules in LE. States she has not had a recurrence since last taper done. No cough/sputum production/ fevers/ chills. No new rashes. Otherwise, reports feeling well today with no complaints.    -14pt ros negative except as otherwise stated above      Review of Systems   Constitutional: Negative for chills, fever and weight loss.   HENT: Negative for ear pain, hearing loss and " tinnitus.    Eyes: Negative for blurred vision, double vision and photophobia.   Respiratory: Positive for shortness of breath. Negative for cough, hemoptysis and sputum production.    Cardiovascular: Negative for chest pain, palpitations and orthopnea.   Gastrointestinal: Negative for heartburn, nausea and vomiting.   Genitourinary: Negative for dysuria and urgency.   Musculoskeletal: Positive for joint pain. Negative for back pain, myalgias and neck pain.   Skin: Positive for rash. Negative for itching.   Neurological: Negative for dizziness, tingling and focal weakness.   Endo/Heme/Allergies: Negative for polydipsia. Does not bruise/bleed easily.   All other systems reviewed and are negative.      Chronic comorbid conditions affecting medical decision making today:  Past Medical History:   Diagnosis Date    Arthritis     Cutaneous sarcoidosis     Hypertension 6/19/2013    Sarcoidosis     Sarcoidosis of lung      Past Surgical History:   Procedure Laterality Date    mediastinoscopy       Family History   Problem Relation Age of Onset    Sarcoidosis Brother      Social History     Substance and Sexual Activity   Alcohol Use Yes    Comment: occasionally     Social History     Tobacco Use   Smoking Status Never Smoker   Smokeless Tobacco Never Used     Social History     Substance and Sexual Activity   Drug Use No       Current Outpatient Medications:     atorvastatin (LIPITOR) 20 MG tablet, Take 20 mg by mouth once daily., Disp: , Rfl:     citalopram (CELEXA) 20 MG tablet, Take 20 mg by mouth., Disp: , Rfl:     gabapentin (NEURONTIN) 300 MG capsule, Take 300 mg by mouth 3 (three) times daily., Disp: , Rfl:     losartan-hydrochlorothiazide 100-25 mg (HYZAAR) 100-25 mg per tablet, Take 1 tablet by mouth once daily., Disp: , Rfl:     melatonin 1 mg/4 mL Drop, Take by mouth., Disp: , Rfl:     meloxicam (MOBIC) 15 MG tablet, Take 15 mg by mouth once daily., Disp: , Rfl: 3    pantoprazole (PROTONIX) 40 MG  tablet, Take 40 mg by mouth once daily., Disp: , Rfl:     predniSONE (DELTASONE) 10 MG tablet, Prednisone 20 mg daily for 6 weeks then 10 mg daily, Disp: 120 tablet, Rfl: 2    tiZANidine (ZANAFLEX) 2 MG tablet, Take 4 mg by mouth every 6 (six) hours as needed., Disp: , Rfl:   No current facility-administered medications for this visit.        Objective:         Vitals:    06/18/20 1037   BP: (!) 159/117   Pulse: 85     Physical Exam   Constitutional: She is oriented to person, place, and time and well-developed, well-nourished, and in no distress. No distress.   HENT:   Head: Normocephalic and atraumatic.   Eyes: Conjunctivae and EOM are normal. Pupils are equal, round, and reactive to light.   Mild lacrimal enlargement   Neck: Normal range of motion. Neck supple. No thyromegaly present.   Cardiovascular: Normal rate, regular rhythm and normal heart sounds.    Pulmonary/Chest: Effort normal and breath sounds normal. No respiratory distress.   Neurological: She is alert and oriented to person, place, and time. Gait normal.   Skin: Skin is warm and dry.     No nodules on lower extremities  No rash on tattoo.      Musculoskeletal: Normal range of motion. No tenderness or edema.         Reviewed old and all outside pertinent medical records available.    All lab results personally reviewed and interpreted by me.  Lab Results   Component Value Date    WBC 6.73 11/21/2018    HGB 14.8 11/21/2018    HCT 43.7 11/21/2018    MCV 90 11/21/2018    MCH 30.3 11/21/2018    MCHC 33.9 11/21/2018    RDW 13.4 11/21/2018     11/21/2018    MPV 11.2 11/21/2018    PLTEST Adequate 05/21/2010       Lab Results   Component Value Date     12/20/2018    K 3.4 (L) 12/20/2018     12/20/2018    CO2 28 12/20/2018     12/20/2018    BUN 21 (H) 12/20/2018    CALCIUM 8.6 (L) 12/20/2018    PROT 6.9 12/20/2018    ALBUMIN 3.5 12/20/2018    BILITOT 0.7 12/20/2018    AST 18 12/20/2018    ALKPHOS 67 12/20/2018    ALT 39 12/20/2018        No results found for: COLORU, APPEARANCEUA, SPECGRAV, PHUR, PROTEINUA, GLUCOSEU, KETONESU, BLOODU, LEUKOCYTESUR, NITRITE, UROBILINOGEN    Lab Results   Component Value Date    CRP 3.2 11/21/2018       Lab Results   Component Value Date    SEDRATE 5 11/21/2018       Lab Results   Component Value Date    SEDRATE 5 11/21/2018       No components found for: 25OHVITDTOT, 01LTKKYW2, 99CYZNJK4, METHODNOTE    No results found for: URICACID    No components found for: TSPOTTB    ESR 21  ACE 73  TB gold -  Acute hep -  APR wnl      Imaging:  All imaging reviewed and independently  interpreted by me.  CXR 9/28/18  FINDINGS:  The lungs are clear and free of infiltrate.  No pleural effusion or pneumothorax. The heart is not enlarged.      Impression       1.  No acute cardiopulmonary process.     PFT 11/13/18  FEV1/FVC normal at 78%  FVC 99%  No significant bronchodilation noted on bronchodilator administration.     Flow volume curve within normal.     Spirometry within normal.         ASSESSMENT / PLAN:     Henrietta Parada is a 44 y.o. Black or  female with:    1. Sarcoid  -Initially diagnosed around 2009 via mediastinal lymph node biopsy c/w sarcoid. Hx pulmonary and cutaneous involvement ; treated successfully w prednisone x 5 years, off since 2014  -Hx mtx: reports stopped after she began developing nodules (?)   -Recurrence of sob + cutaneous nodules + tattoo involvement x 1 month as before after covid19 infection.  -Evaluated by pulm, cxr not suggestive of active sarcoid. PFT wnl. Tolerated taper without worsening sob   -Evaluated by cardiology at [facility] 2.2 hx palpitations and chest pain.  No evidence on  EKG of arythmias. Cardiac MRI done- no evidence sarcoid involvement.   -Further escalation of therapy / initiation systemic steroid sparing agent (ie plaquenil) will depend on organ involvement vs limited cutaneous involvement.  Reassess at rtc  -Prx prednisone w taper          F/u 4 weeks    Method  of contact with patient concerns: [facility]      Susanne Villalobos M.D.  Rheumatology Department   [facility] [place] [address], [place] [address]  Phone: [phone]  Fax: [phone]

## 2020-06-22 ENCOUNTER — TELEPHONE (OUTPATIENT)
Dept: PULMONOLOGY | Facility: CLINIC | Age: 45
End: 2020-06-22

## 2020-07-16 ENCOUNTER — OFFICE VISIT (OUTPATIENT)
Dept: RHEUMATOLOGY | Facility: CLINIC | Age: 45
End: 2020-07-16
Payer: COMMERCIAL

## 2020-07-16 ENCOUNTER — PATIENT MESSAGE (OUTPATIENT)
Dept: RHEUMATOLOGY | Facility: CLINIC | Age: 45
End: 2020-07-16

## 2020-07-16 ENCOUNTER — TELEPHONE (OUTPATIENT)
Dept: RHEUMATOLOGY | Facility: CLINIC | Age: 45
End: 2020-07-16

## 2020-07-16 DIAGNOSIS — D86.9 SARCOIDOSIS: Primary | ICD-10-CM

## 2020-07-16 PROCEDURE — 99214 PR OFFICE/OUTPT VISIT, EST, LEVL IV, 30-39 MIN: ICD-10-PCS | Mod: 95,,, | Performed by: INTERNAL MEDICINE

## 2020-07-16 PROCEDURE — 99214 OFFICE O/P EST MOD 30 MIN: CPT | Mod: 95,,, | Performed by: INTERNAL MEDICINE

## 2020-07-16 NOTE — PROGRESS NOTES
The patient location is: work , [place]   The chief complaint leading to consultation is:  Follow-up sarcoidosis    Visit type: audiovisual    Face to Face time with patient: 20 min   30  minutes of total time spent on the encounter, which includes face to face time and non-face to face time preparing to see the patient (eg, review of tests), Obtaining and/or reviewing separately obtained history, Documenting clinical information in the electronic or other health record, Independently interpreting results (not separately reported) and communicating results to the patient/family/caregiver, or Care coordination (not separately reported).         Each patient to whom he or she provides medical services by telemedicine is:  (1) informed of the relationship between the physician and patient and the respective role of any other health care provider with respect to management of the patient; and (2) notified that he or she may decline to receive medical services by telemedicine and may withdraw from such care at any time.    Notes:       CC:  Follow-up cutaneous sarcoidosis      History of Present Illness:  Henrietta Lai a 44 y.o.yo female who presents for further evaluation of cutaneous sarcoidosis  Few weeks back she was seen by Dr. MARIN for a flare of cutaneous sarcoidosis which involved rash over tattoos  In March she had COVID infection from which she recovered  When she was started on Plaquenil for COVID she developed an allergic  reaction , nodules on the skin in lower extremity and was stopped after 3 days    However she noticed recurrence of sarcoid on her tab dose in the last 2 months, nodules and lower extremities.  She denies any fever, chills, fatigue or weight loss.  She denies any respiratory symptoms like cough or shortness of breath  She was given 2 tapering courses of prednisone since June with no improvement in skin lesions   She denies any other organ involvement at this point  No joint pain or joint  "swelling noted.    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 Hx aspergillosus. Abnormal cxr led to bronchoscopy w diagnostic mediastinal lymph node biopsy consistent w sarcoid. Also had subcutaneous nodules on LE mostly, but also on forearms.   -Treated with prednisone approx 5 years, off since 2014.   -Was doing well until approx 1 month ago when she started developing sob and subcutaneous nodules in lower extremities. Also developed nodules on tattoos which is new.   -Previously tried mtx: stopped 2.2 "developed nodules in lungs?" Does not recall how long she had been on or how many tabs.  -Recently evaluated by cardiology at [facility] 2.2 new onset palpitations, chest pain, sob. EKG and echo done per pt, results unknown.   -No hx uveitis, numbness/tingling, fevers, weightloss, night sweats, cough, abdominal pain, swollen joints.   -+Dry eyes         Review of Systems:  Constitutional: Denies fever, chills. No recent weight changes.   Fatigue: no  Muscle weakness: no  Headaches: no new headaches  Eyes: No redness .  No recent visual changes.  ENT:  No oral or nasal ulcers.  Card: No chest pain.  Resp: No cough or sob.   Gastro: No nausea or vomiting.  No heartburn.  Constipation: no  Diarrhea: no  Genito:  No dysuria.  No genital ulcers.  Skin:+rash   Raynauds:no  Neuro: No numbness / tingling.   Psych: No depression, anxiety  Endo:  no excess thirst.  Heme: no abnormal bleeding or bruising  Clots:none       Past Medical History:   Diagnosis Date    Arthritis     Cutaneous sarcoidosis     Hypertension 6/19/2013    Sarcoidosis     Sarcoidosis of lung        Past Surgical History:   Procedure Laterality Date    mediastinoscopy           Social History     Tobacco Use    Smoking status: Never Smoker    Smokeless tobacco: Never Used   Substance Use Topics    Alcohol use: Yes     " Comment: occasionally    Drug use: No       Family History   Problem Relation Age of Onset    Sarcoidosis Brother        Review of patient's allergies indicates:   Allergen Reactions    Sulfa (sulfonamide antibiotics) Itching and Swelling    Phenytoin sodium extended Other (See Comments)         :      OBJECTIVE:     Vital Signs   There were no vitals filed for this visit.  Physical Exam:  General Appearance:  NAD.     Skin:+ rash noted over tattoos on both upper arms        Laboratory: I have reviewed all of the patient's relevant lab work available in the medical record and have utilized this in my evaluation and management recommendations today    Imaging : I have reviewed all of the patient's diagnostic/imaging results available in the medical record and have utilized this in my evaluation and management recommendations today.    Notes reviewed  Other procedures:    ASSESSMENT/PLAN:     Sarcoidosis  -     CBC auto differential; Standing  -     C-Reactive Protein; Standing  -     Angiotensin converting enzyme; Standing  -     Quantiferon Gold TB; Future; Expected date: 07/16/2020  -     Hepatitis Panel, Acute; Future  -     Vitamin D; Standing  -     Calcitriol; Standing      I requested her to do additional lab work as above  Plaquenil-I suspect the lesions she is mentioning were probably erythema nodosum/subcutaneous nodules related to sarcoidosis and not an allergy to Plaquenil  Methotrexate lung nodules in the past and hence contraindicated  Steroids no response    Advised her to get further labs as above including hep panel and TB gold and if negative we can start her on CellCept 500 mg p.o. b.i.d. followed by 1000 mg p.o. b.i.d. a week later    3 M return with all 4     Risks vs Benefits and potential side effects of medication prescribed today were discussed with patient. Medication literature given to patient up discharge  Went over uptodate information /literature on the meds prescribed today     Patient advised to hold DMARD and/or biologic therapy for signs of infection or for surgery. If you are unsure what to do please call our office for instruction. [facility] [phone]  CellCept infection, malignancy risk potential, COVID      Disclaimer: This note was prepared using voice recognition system and is likely to have sound alike errors and is not proof read.  Please call me with any questions.

## 2020-07-20 ENCOUNTER — PATIENT MESSAGE (OUTPATIENT)
Dept: RHEUMATOLOGY | Facility: CLINIC | Age: 45
End: 2020-07-20

## 2020-07-20 DIAGNOSIS — D86.9 SARCOIDOSIS: Primary | ICD-10-CM

## 2020-07-20 RX ORDER — PREDNISONE 10 MG/1
10 TABLET ORAL DAILY
Qty: 30 TABLET | Refills: 1 | Status: SHIPPED | OUTPATIENT
Start: 2020-07-20 | End: 2020-09-23 | Stop reason: SDUPTHER

## 2020-07-22 ENCOUNTER — OFFICE VISIT (OUTPATIENT)
Dept: PULMONOLOGY | Facility: CLINIC | Age: 45
End: 2020-07-22
Payer: COMMERCIAL

## 2020-07-22 ENCOUNTER — HOSPITAL ENCOUNTER (OUTPATIENT)
Dept: RADIOLOGY | Facility: HOSPITAL | Age: 45
Discharge: HOME | End: 2020-07-22
Attending: INTERNAL MEDICINE
Payer: COMMERCIAL

## 2020-07-22 VITALS
TEMPERATURE: 97 F | RESPIRATION RATE: 17 BRPM | OXYGEN SATURATION: 99 % | DIASTOLIC BLOOD PRESSURE: 86 MMHG | HEART RATE: 100 BPM | BODY MASS INDEX: 32.94 KG/M2 | WEIGHT: 235.25 LBS | HEIGHT: 71 IN | SYSTOLIC BLOOD PRESSURE: 128 MMHG

## 2020-07-22 DIAGNOSIS — D86.9 SARCOIDOSIS: ICD-10-CM

## 2020-07-22 DIAGNOSIS — U07.1 COVID-19: Primary | ICD-10-CM

## 2020-07-22 DIAGNOSIS — R22.9 SUBCUTANEOUS NODULES: ICD-10-CM

## 2020-07-22 DIAGNOSIS — Z79.52 CURRENT CHRONIC USE OF SYSTEMIC STEROIDS: ICD-10-CM

## 2020-07-22 DIAGNOSIS — Z23 NEED FOR 23-POLYVALENT PNEUMOCOCCAL POLYSACCHARIDE VACCINE: ICD-10-CM

## 2020-07-22 DIAGNOSIS — D86.9 SARCOIDOSIS: Primary | ICD-10-CM

## 2020-07-22 DIAGNOSIS — E66.09 CLASS 1 OBESITY DUE TO EXCESS CALORIES WITH BODY MASS INDEX (BMI) OF 32.0 TO 32.9 IN ADULT, UNSPECIFIED WHETHER SERIOUS COMORBIDITY PRESENT: ICD-10-CM

## 2020-07-22 DIAGNOSIS — Z91.89 AT RISK FOR OPPORTUNISTIC INFECTIONS: ICD-10-CM

## 2020-07-22 PROCEDURE — 99999 PR PBB SHADOW E&M-EST. PATIENT-LVL V: ICD-10-PCS | Mod: PBBFAC,,, | Performed by: INTERNAL MEDICINE

## 2020-07-22 PROCEDURE — 71046 X-RAY EXAM CHEST 2 VIEWS: CPT | Mod: 26,,, | Performed by: RADIOLOGY

## 2020-07-22 PROCEDURE — 90732 PPSV23 VACC 2 YRS+ SUBQ/IM: CPT | Mod: S$GLB,,, | Performed by: INTERNAL MEDICINE

## 2020-07-22 PROCEDURE — 99999 PR PBB SHADOW E&M-EST. PATIENT-LVL V: CPT | Mod: PBBFAC,,, | Performed by: INTERNAL MEDICINE

## 2020-07-22 PROCEDURE — 99215 PR OFFICE/OUTPT VISIT, EST, LEVL V, 40-54 MIN: ICD-10-PCS | Mod: 25,S$GLB,, | Performed by: INTERNAL MEDICINE

## 2020-07-22 PROCEDURE — 90471 IMMUNIZATION ADMIN: CPT | Mod: S$GLB,,, | Performed by: INTERNAL MEDICINE

## 2020-07-22 PROCEDURE — 71046 X-RAY EXAM CHEST 2 VIEWS: CPT | Mod: TC

## 2020-07-22 PROCEDURE — 71046 XR CHEST PA AND LATERAL: ICD-10-PCS | Mod: 26,,, | Performed by: RADIOLOGY

## 2020-07-22 PROCEDURE — 90732 PNEUMOCOCCAL POLYSACCHARIDE VACCINE 23-VALENT =>2YO SQ IM: ICD-10-PCS | Mod: S$GLB,,, | Performed by: INTERNAL MEDICINE

## 2020-07-22 PROCEDURE — 90471 PNEUMOCOCCAL POLYSACCHARIDE VACCINE 23-VALENT =>2YO SQ IM: ICD-10-PCS | Mod: S$GLB,,, | Performed by: INTERNAL MEDICINE

## 2020-07-22 PROCEDURE — 99215 OFFICE O/P EST HI 40 MIN: CPT | Mod: 25,S$GLB,, | Performed by: INTERNAL MEDICINE

## 2020-07-22 RX ORDER — AMLODIPINE BESYLATE 10 MG/1
10 TABLET ORAL DAILY
COMMUNITY

## 2020-07-22 RX ORDER — MECLIZINE HYDROCHLORIDE 25 MG/1
25 TABLET ORAL 3 TIMES DAILY PRN
COMMUNITY

## 2020-07-22 RX ORDER — ASPIRIN 81 MG/1
81 TABLET ORAL DAILY
COMMUNITY

## 2020-07-22 RX ORDER — DICLOFENAC SODIUM 75 MG/1
75 TABLET, DELAYED RELEASE ORAL 2 TIMES DAILY
COMMUNITY
End: 2022-07-11

## 2020-07-22 NOTE — PROGRESS NOTES
Pulmonary Outpatient Follow Up Visit     Subjective:       Patient ID: Henrietta Parada is a 44 y.o. female.    Chief Complaint: Sarcoidosis      HPI      44-year-old female patient presenting for follow-up.    Last seen November 2018.    Known with pulmonary sarcoidosis status post mediastinal lymph node biopsy in 2009.    Treated with steroids for about 4 or 5 years and stopped prednisone 2014.    Presented to see me in 2018 complaining of worsening shortness of breath and subcutaneous nodules, I have started her on prednisone 40 mg daily however due to tremors dose was decreased to 30 mg daily and tapered to 10 mg maintenance.  She states she took it since November 2018 till March 2020 when she stopped it due to COVID 19 infection.    She was prescribed Plaquenil for COVID-19 and developed subsequently subcutaneous nodules again.    Recently restarted on prednisone, upon discontinuing prednisone nodules recurred, a blood work is pending done at [facility] and patient to resume prednisone 10 mg daily.    Plan by Rheumatology is to start CellCept.    Patient denies coughing wheezing changes in her breathing pattern.    Her weight has been stable at around 1:06 a.m. kg is for the last year.        Review of Systems   Constitutional: Positive for fatigue.   HENT: Negative for nosebleeds.    Respiratory: Positive for shortness of breath. Negative for cough.    Genitourinary: Negative for hematuria.   Musculoskeletal: Positive for arthralgias and back pain.   Skin:        Upper and lower extremities subcutaneous nodules    Gastrointestinal: Negative for abdominal pain.   Neurological: Negative for syncope.   Hematological: Positive for adenopathy.   Psychiatric/Behavioral: The patient is not nervous/anxious.        Outpatient Encounter Medications as of 7/22/2020   Medication Sig Dispense Refill    amLODIPine (NORVASC) 10 MG tablet Take 10 mg by mouth once daily.      aspirin  "(ECOTRIN) 81 MG EC tablet Take 81 mg by mouth once daily.      atorvastatin (LIPITOR) 20 MG tablet Take 20 mg by mouth once daily.      citalopram (CELEXA) 10 MG tablet Take 20 mg by mouth.       diclofenac (VOLTAREN) 75 MG EC tablet Take 75 mg by mouth 2 (two) times daily.      gabapentin (NEURONTIN) 300 MG capsule Take 300 mg by mouth 3 (three) times daily.      losartan-hydrochlorothiazide 100-25 mg (HYZAAR) 100-25 mg per tablet Take 1 tablet by mouth once daily.      meclizine (ANTIVERT) 25 mg tablet Take 25 mg by mouth 3 (three) times daily as needed.      melatonin 1 mg Tab Take by mouth.       pantoprazole (PROTONIX) 40 MG tablet Take 40 mg by mouth once daily.      predniSONE (DELTASONE) 10 MG tablet Take 1 tablet (10 mg total) by mouth once daily. 30 tablet 1    meloxicam (MOBIC) 15 MG tablet Take 15 mg by mouth once daily.  3    tiZANidine (ZANAFLEX) 2 MG tablet Take 4 mg by mouth every 6 (six) hours as needed.       No facility-administered encounter medications on file as of 7/22/2020.        Objective:     Vital Signs (Most Recent)  Vital Signs  Temp: 97.3 °F (36.3 °C)  Pulse: 100  Resp: 17  SpO2: 99 %  BP: 128/86  Height and Weight  Height: 5' 11" (180.3 cm)  Weight: 106.7 kg (235 lb 3.7 oz)  BSA (Calculated - sq m): 2.31 sq meters  BMI (Calculated): 32.8  Weight in (lb) to have BMI = 25: 178.9]  Wt Readings from Last 2 Encounters:   07/22/20 106.7 kg (235 lb 3.7 oz)   06/18/20 104.3 kg (229 lb 15 oz)       Physical Exam   Constitutional: She is oriented to person, place, and time. She appears well-developed and well-nourished.   HENT:   Head: Normocephalic.   Neck: Neck supple.   Cardiovascular: Normal rate, regular rhythm and normal heart sounds.   Pulmonary/Chest: Normal expansion and effort normal. No respiratory distress. She has decreased breath sounds. She has no wheezes.   Abdominal: Soft.   Musculoskeletal:         General: Deformity present. No edema.      Comments: Upper and lower " extremity subcutaneous nodules   Lymphadenopathy:     She has no cervical adenopathy.   Neurological: She is alert and oriented to person, place, and time. Gait normal.   Skin: Skin is warm. No cyanosis. Nails show no clubbing.   Lower extremity subcutaneous nodules resolved.   Psychiatric: She has a normal mood and affect. Her behavior is normal. Judgment and thought content normal.       Laboratory  Lab Results   Component Value Date    WBC 6.73 11/21/2018    RBC 4.88 11/21/2018    HGB 14.8 11/21/2018    HCT 43.7 11/21/2018    MCV 90 11/21/2018    MCH 30.3 11/21/2018    MCHC 33.9 11/21/2018    RDW 13.4 11/21/2018     11/21/2018    MPV 11.2 11/21/2018    GRAN 3.2 11/21/2018    GRAN 48.0 11/21/2018    LYMPH 2.6 11/21/2018    LYMPH 39.1 11/21/2018    MONO 0.8 11/21/2018    MONO 12.2 11/21/2018    EOS 0.0 11/21/2018    BASO 0.03 11/21/2018    EOSINOPHIL 0.3 11/21/2018    BASOPHIL 0.4 11/21/2018       BMP  Lab Results   Component Value Date     12/20/2018    K 3.4 (L) 12/20/2018     12/20/2018    CO2 28 12/20/2018    BUN 21 (H) 12/20/2018    CREATININE 0.9 12/20/2018    CALCIUM 8.6 (L) 12/20/2018    ANIONGAP 9 12/20/2018    ESTGFRAFRICA >60.0 12/20/2018    EGFRNONAA >60.0 12/20/2018    AST 18 12/20/2018    ALT 39 12/20/2018    PROT 6.9 12/20/2018       No results found for: BNP    No results found for: TSH    Lab Results   Component Value Date    SEDRATE 5 11/21/2018       Lab Results   Component Value Date    CRP 3.2 11/21/2018     No results found for: IGE     No results found for: ASPERGILLUS  No results found for: AFUMIGATUSCL     Lab Results   Component Value Date    ACE 73 (H) 08/02/2013        Diagnostic Results:  I have personally reviewed today the following studies:    Spirometry  personally reviewed.  November 2018  No obstruction or restriction noted.  There was slight decrease of FEV1 and FVC when compared to PFT from 2013.     Chest x-ray September 28, 2018 personally reviewed     Clear  lungs.     Chest x-ray 2013 personally reviewed normal lungs.     6 min walk test 2013 showed mild reduction of exercise capacity, no significant desaturation     PFT 2013 personally reviewed within normal.     Assessment/Plan:   Sarcoidosis  -     X-Ray Chest PA And Lateral; Future; Expected date: 07/22/2020  -     COVID-19 Routine Screening  -     Spirometry with/without bronchodilator & DLCO; Future    Need for 23-polyvalent pneumococcal polysaccharide vaccine  -     Pneumococcal Polysaccharide Vaccine (23 Valent) (SQ/IM)    Subcutaneous nodules    At risk for opportunistic infections    Current chronic use of systemic steroids      Patient sarcoidosis appears to be more active subcutaneous sleep.    Will repeat chest x-ray spirometry with DLCO and follow on her pending serologies.    I agree with rheumatology plan for CellCept as a steroid sparing agent.      General weight loss/lifestyle modification strategies discussed (elicit support from others; identify saboteurs; non-food rewards).  Diet interventions: low calorie (1000 kCal/d) deficit diet      Up-to-date on influenza and Prevnar 13.  P PSV 23 today.      Follow up in about 3 months (around 10/22/2020).    This note was prepared using voice recognition system and is likely to have sound alike errors that may have been overlooked even after proof reading.  Please call me with any questions    Discussed diagnosis, its evaluation, treatment and usual course. All questions answered.      Alexander Cheng MD

## 2020-07-28 ENCOUNTER — TELEPHONE (OUTPATIENT)
Dept: PULMONOLOGY | Facility: CLINIC | Age: 45
End: 2020-07-28

## 2020-07-28 NOTE — TELEPHONE ENCOUNTER
Spoke to pt pt wanted to have Covid at [facility] her place of employment and fax the results to clinic before testing.

## 2020-07-30 ENCOUNTER — TELEPHONE (OUTPATIENT)
Dept: PULMONOLOGY | Facility: CLINIC | Age: 45
End: 2020-07-30

## 2020-07-31 ENCOUNTER — PATIENT MESSAGE (OUTPATIENT)
Dept: RHEUMATOLOGY | Facility: CLINIC | Age: 45
End: 2020-07-31

## 2020-07-31 ENCOUNTER — PATIENT MESSAGE (OUTPATIENT)
Dept: PULMONOLOGY | Facility: CLINIC | Age: 45
End: 2020-07-31

## 2020-07-31 NOTE — TELEPHONE ENCOUNTER
Spoke to pt.  Did not receive results of Covid test in time for pierre to be done today.  Pt states she will call back to reschedule.

## 2020-08-03 ENCOUNTER — TELEPHONE (OUTPATIENT)
Dept: PULMONOLOGY | Facility: CLINIC | Age: 45
End: 2020-08-03

## 2020-08-03 NOTE — TELEPHONE ENCOUNTER
----- Message from Maren Gallardo sent at 8/3/2020  3:56 PM CDT -----  Regarding: Appt  Contact: pt  Reason: Pt calling to schedule PFT appt    Communication: [phone]

## 2020-08-04 ENCOUNTER — LAB VISIT (OUTPATIENT)
Dept: OTOLARYNGOLOGY | Facility: CLINIC | Age: 45
End: 2020-08-04
Payer: COMMERCIAL

## 2020-08-04 DIAGNOSIS — U07.1 COVID-19: ICD-10-CM

## 2020-08-04 PROCEDURE — U0003 INFECTIOUS AGENT DETECTION BY NUCLEIC ACID (DNA OR RNA); SEVERE ACUTE RESPIRATORY SYNDROME CORONAVIRUS 2 (SARS-COV-2) (CORONAVIRUS DISEASE [COVID-19]), AMPLIFIED PROBE TECHNIQUE, MAKING USE OF HIGH THROUGHPUT TECHNOLOGIES AS DESCRIBED BY CMS-2020-01-R: HCPCS

## 2020-08-05 LAB — SARS-COV-2 RNA RESP QL NAA+PROBE: NOT DETECTED

## 2020-08-07 ENCOUNTER — CLINICAL SUPPORT (OUTPATIENT)
Dept: PULMONOLOGY | Facility: CLINIC | Age: 45
End: 2020-08-07
Payer: COMMERCIAL

## 2020-08-07 DIAGNOSIS — D86.9 SARCOIDOSIS: ICD-10-CM

## 2020-08-07 LAB
BRPFT: ABNORMAL
DLCO ADJ PRE: 21.23 ML/(MIN*MMHG) (ref 22.98–34.44)
DLCO SINGLE BREATH LLN: 22.98
DLCO SINGLE BREATH PRE REF: 74 %
DLCO SINGLE BREATH REF: 28.71
DLCOC SBVA LLN: 3.57
DLCOC SBVA PRE REF: 99.7 %
DLCOC SBVA REF: 4.82
DLCOC SINGLE BREATH LLN: 22.98
DLCOC SINGLE BREATH PRE REF: 74 %
DLCOC SINGLE BREATH REF: 28.71
DLCOVA LLN: 3.57
DLCOVA PRE REF: 99.7 %
DLCOVA PRE: 4.8 ML/(MIN*MMHG*L) (ref 3.57–6.07)
DLCOVA REF: 4.82
DLVAADJ PRE: 4.8 ML/(MIN*MMHG*L) (ref 3.57–6.07)
FEF 25 75 LLN: 1.49
FEF 25 75 PRE REF: 88.7 %
FEF 25 75 REF: 2.93
FEV1 FVC LLN: 70
FEV1 FVC PRE REF: 96.9 %
FEV1 FVC REF: 81
FEV1 LLN: 2.3
FEV1 PRE REF: 91.5 %
FEV1 REF: 3
FVC LLN: 2.89
FVC PRE REF: 93.7 %
FVC REF: 3.74
IVC PRE: 3.48 L (ref 2.89–4.59)
IVC SINGLE BREATH LLN: 2.89
IVC SINGLE BREATH PRE REF: 93.2 %
IVC SINGLE BREATH REF: 3.74
PEF LLN: 5.05
PEF PRE REF: 88.2 %
PEF REF: 7.46
PRE DLCO: 21.23 ML/(MIN*MMHG) (ref 22.98–34.44)
PRE FEF 25 75: 2.6 L/S (ref 1.49–4.37)
PRE FET 100: 7.61 SEC
PRE FEV1 FVC: 78.34 % (ref 70.24–91.39)
PRE FEV1: 2.75 L (ref 2.3–3.71)
PRE FVC: 3.5 L (ref 2.89–4.59)
PRE PEF: 6.58 L/S (ref 5.05–9.86)
VA PRE: 4.42 L (ref 5.81–5.81)
VA SINGLE BREATH LLN: 5.81
VA SINGLE BREATH PRE REF: 76.1 %
VA SINGLE BREATH REF: 5.81

## 2020-08-07 PROCEDURE — 94010 BREATHING CAPACITY TEST: CPT | Mod: S$GLB,,, | Performed by: INTERNAL MEDICINE

## 2020-08-07 PROCEDURE — 94010 BREATHING CAPACITY TEST: ICD-10-PCS | Mod: S$GLB,,, | Performed by: INTERNAL MEDICINE

## 2020-08-20 ENCOUNTER — TELEPHONE (OUTPATIENT)
Dept: RHEUMATOLOGY | Facility: CLINIC | Age: 45
End: 2020-08-20

## 2020-08-20 DIAGNOSIS — D86.9 SARCOIDOSIS: Primary | ICD-10-CM

## 2020-08-20 RX ORDER — MYCOPHENOLATE MOFETIL 500 MG/1
1000 TABLET ORAL 2 TIMES DAILY
Qty: 120 TABLET | Refills: 3 | Status: SHIPPED | OUTPATIENT
Start: 2020-08-20 | End: 2020-09-23 | Stop reason: SDUPTHER

## 2020-08-20 NOTE — LETTER
August 20, 2020      [facility]  [address]  [place] [address]  Phone: [phone]  Fax: [phone]       Patient: Henrietta Parada   YOB: 1975  Date of Visit: 08/20/2020    To Whom It May Concern:    Brian Parada  is currently under my care for the treatment of her autoimmune condition and she is on immunosuppressant medications.  In view of her high risk nature, please consider her accommodative requests , as she is working in a healthcare facility    If you have any questions or concerns, or if I can be of further assistance, please do not hesitate to contact me.    Sincerely,    Adela Mitchell MD

## 2020-08-20 NOTE — TELEPHONE ENCOUNTER
Patient called and her labs reviewed with her she has stable CBC with some leukopenia, none CMP normal normal renal function, normal calcium level  Ace still normal at 44       Last she presented with a tatoo reaction and subcutaneous nodules  These have been unresponsive to prednisone 10 mg a day    I have reviewed her recent pulmonary notes per him she was doing well when she was on prednisone then gradually when she tapered that is when she noted these nodules, and they agree with CellCept      She was also evaluated by Dr José perez, dermatologist, he felt this was possibly related to sarcoidosis and biopsy of the nodules would not yield anything more    I have reviewed all her labs today including CBC, CMP, Ace, hepatitis panel, TB gold which was negative    Will start CellCept 500 mg p.o. b.i.d. for a week then increase to 1000 mg p.o. b.i.d.    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

## 2020-08-21 ENCOUNTER — PATIENT MESSAGE (OUTPATIENT)
Dept: RHEUMATOLOGY | Facility: CLINIC | Age: 45
End: 2020-08-21

## 2020-08-25 ENCOUNTER — TELEPHONE (OUTPATIENT)
Dept: RHEUMATOLOGY | Facility: CLINIC | Age: 45
End: 2020-08-25

## 2020-08-26 ENCOUNTER — PATIENT MESSAGE (OUTPATIENT)
Dept: RHEUMATOLOGY | Facility: CLINIC | Age: 45
End: 2020-08-26

## 2020-09-02 ENCOUNTER — TELEPHONE (OUTPATIENT)
Dept: RHEUMATOLOGY | Facility: CLINIC | Age: 45
End: 2020-09-02

## 2020-09-23 ENCOUNTER — LAB VISIT (OUTPATIENT)
Dept: LAB | Facility: HOSPITAL | Age: 45
End: 2020-09-23
Attending: INTERNAL MEDICINE
Payer: COMMERCIAL

## 2020-09-23 ENCOUNTER — OFFICE VISIT (OUTPATIENT)
Dept: RHEUMATOLOGY | Facility: CLINIC | Age: 45
End: 2020-09-23
Payer: COMMERCIAL

## 2020-09-23 VITALS
WEIGHT: 242.75 LBS | DIASTOLIC BLOOD PRESSURE: 103 MMHG | SYSTOLIC BLOOD PRESSURE: 142 MMHG | HEART RATE: 82 BPM | BODY MASS INDEX: 33.85 KG/M2

## 2020-09-23 DIAGNOSIS — Z79.899 ENCOUNTER FOR LONG-TERM (CURRENT) USE OF HIGH-RISK MEDICATION: ICD-10-CM

## 2020-09-23 DIAGNOSIS — R21 RASH: ICD-10-CM

## 2020-09-23 DIAGNOSIS — D84.9 IMMUNOSUPPRESSED STATUS: ICD-10-CM

## 2020-09-23 DIAGNOSIS — D86.9 SARCOIDOSIS: ICD-10-CM

## 2020-09-23 DIAGNOSIS — D86.9 SARCOIDOSIS: Primary | ICD-10-CM

## 2020-09-23 LAB
ALBUMIN SERPL BCP-MCNC: 3.8 G/DL (ref 3.5–5.2)
ALP SERPL-CCNC: 62 U/L (ref 55–135)
ALT SERPL W/O P-5'-P-CCNC: 30 U/L (ref 10–44)
ANION GAP SERPL CALC-SCNC: 10 MMOL/L (ref 8–16)
AST SERPL-CCNC: 22 U/L (ref 10–40)
BASOPHILS # BLD AUTO: 0.02 K/UL (ref 0–0.2)
BASOPHILS NFR BLD: 0.4 % (ref 0–1.9)
BILIRUB SERPL-MCNC: 0.7 MG/DL (ref 0.1–1)
BUN SERPL-MCNC: 14 MG/DL (ref 6–20)
CALCIUM SERPL-MCNC: 8.9 MG/DL (ref 8.7–10.5)
CHLORIDE SERPL-SCNC: 107 MMOL/L (ref 95–110)
CO2 SERPL-SCNC: 22 MMOL/L (ref 23–29)
CREAT SERPL-MCNC: 0.8 MG/DL (ref 0.5–1.4)
CRP SERPL-MCNC: 8.6 MG/L (ref 0–8.2)
DIFFERENTIAL METHOD: NORMAL
EOSINOPHIL # BLD AUTO: 0.1 K/UL (ref 0–0.5)
EOSINOPHIL NFR BLD: 1.9 % (ref 0–8)
ERYTHROCYTE [DISTWIDTH] IN BLOOD BY AUTOMATED COUNT: 13.6 % (ref 11.5–14.5)
ERYTHROCYTE [SEDIMENTATION RATE] IN BLOOD BY WESTERGREN METHOD: 25 MM/HR (ref 0–36)
EST. GFR  (AFRICAN AMERICAN): >60 ML/MIN/1.73 M^2
EST. GFR  (NON AFRICAN AMERICAN): >60 ML/MIN/1.73 M^2
GLUCOSE SERPL-MCNC: 95 MG/DL (ref 70–110)
HCT VFR BLD AUTO: 42 % (ref 37–48.5)
HGB BLD-MCNC: 14.3 G/DL (ref 12–16)
IMM GRANULOCYTES # BLD AUTO: 0.02 K/UL (ref 0–0.04)
IMM GRANULOCYTES NFR BLD AUTO: 0.4 % (ref 0–0.5)
LYMPHOCYTES # BLD AUTO: 1.4 K/UL (ref 1–4.8)
LYMPHOCYTES NFR BLD: 31.1 % (ref 18–48)
MCH RBC QN AUTO: 29.7 PG (ref 27–31)
MCHC RBC AUTO-ENTMCNC: 34 G/DL (ref 32–36)
MCV RBC AUTO: 87 FL (ref 82–98)
MONOCYTES # BLD AUTO: 0.6 K/UL (ref 0.3–1)
MONOCYTES NFR BLD: 13.4 % (ref 4–15)
NEUTROPHILS # BLD AUTO: 2.5 K/UL (ref 1.8–7.7)
NEUTROPHILS NFR BLD: 53.2 % (ref 38–73)
NRBC BLD-RTO: 0 /100 WBC
PLATELET # BLD AUTO: 216 K/UL (ref 150–350)
PMV BLD AUTO: 10.8 FL (ref 9.2–12.9)
POTASSIUM SERPL-SCNC: 3.8 MMOL/L (ref 3.5–5.1)
PROT SERPL-MCNC: 7.5 G/DL (ref 6–8.4)
RBC # BLD AUTO: 4.82 M/UL (ref 4–5.4)
SODIUM SERPL-SCNC: 139 MMOL/L (ref 136–145)
WBC # BLD AUTO: 4.63 K/UL (ref 3.9–12.7)

## 2020-09-23 PROCEDURE — 82164 ANGIOTENSIN I ENZYME TEST: CPT

## 2020-09-23 PROCEDURE — 85652 RBC SED RATE AUTOMATED: CPT

## 2020-09-23 PROCEDURE — 99214 OFFICE O/P EST MOD 30 MIN: CPT | Mod: S$GLB,,, | Performed by: INTERNAL MEDICINE

## 2020-09-23 PROCEDURE — 36415 COLL VENOUS BLD VENIPUNCTURE: CPT

## 2020-09-23 PROCEDURE — 99214 PR OFFICE/OUTPT VISIT, EST, LEVL IV, 30-39 MIN: ICD-10-PCS | Mod: S$GLB,,, | Performed by: INTERNAL MEDICINE

## 2020-09-23 PROCEDURE — 85025 COMPLETE CBC W/AUTO DIFF WBC: CPT

## 2020-09-23 PROCEDURE — 99999 PR PBB SHADOW E&M-EST. PATIENT-LVL III: CPT | Mod: PBBFAC,,, | Performed by: INTERNAL MEDICINE

## 2020-09-23 PROCEDURE — 99999 PR PBB SHADOW E&M-EST. PATIENT-LVL III: ICD-10-PCS | Mod: PBBFAC,,, | Performed by: INTERNAL MEDICINE

## 2020-09-23 PROCEDURE — 86140 C-REACTIVE PROTEIN: CPT

## 2020-09-23 PROCEDURE — 80053 COMPREHEN METABOLIC PANEL: CPT

## 2020-09-23 PROCEDURE — 82652 VIT D 1 25-DIHYDROXY: CPT

## 2020-09-23 RX ORDER — MYCOPHENOLATE MOFETIL 500 MG/1
1500 TABLET ORAL 2 TIMES DAILY
Qty: 540 TABLET | Refills: 1 | Status: SHIPPED | OUTPATIENT
Start: 2020-09-23 | End: 2021-08-19 | Stop reason: SDUPTHER

## 2020-09-23 RX ORDER — NORETHINDRONE ACETATE AND ETHINYL ESTRADIOL AND FERROUS FUMARATE 1MG-20(21)
1 KIT ORAL DAILY
COMMUNITY
Start: 2020-09-14

## 2020-09-23 RX ORDER — PREDNISONE 5 MG/1
5 TABLET ORAL DAILY
Qty: 30 TABLET | Refills: 1 | Status: SHIPPED | OUTPATIENT
Start: 2020-09-23 | End: 2020-12-23

## 2020-09-23 RX ORDER — BETAMETHASONE DIPROPIONATE 0.5 MG/G
CREAM TOPICAL
COMMUNITY
Start: 2020-07-28 | End: 2022-07-11

## 2020-09-23 NOTE — PATIENT INSTRUCTIONS
Mycophenolate tablets  What is this medicine?  MYCOPHENOLATE MOFETIL (vu REYNOSO oh late CLINTON fe til) is used to decrease the immune system's response to a transplanted organ.  How should I use this medicine?  Take this medicine by mouth with a full glass of water. Follow the directions on the prescription label. Take this medicine on an empty stomach, at least 1 hour before or 2 hours after food. Do not take with food unless your doctor approves. Swallow the medicine whole. Do not cut, crush, or chew the medicine. If the medicine is broken or is not intact, do not get the powder on your skin or eyes. If contact occurs, rinse thoroughly with water. Take your medicine at regular intervals. Do not take your medicine more often than directed. Do not stop taking except on your doctor's advice.  A special MedGuide will be given to you by the pharmacist with each prescription and refill. Be sure to read this information carefully each time.  Talk to your pediatrician regarding the use of this medicine in children. Special care may be needed.  What side effects may I notice from receiving this medicine?  Side effects that you should report to your doctor or health care professional as soon as possible:  · allergic reactions like skin rash, itching or hives, swelling of the face, lips, or tongue  · changes in vision  · dizziness  · fever, chills or any other sign of infection  · unusual bleeding or bruising  · unusually weak or tired  Side effects that usually do not require medical attention (report to your doctor or health care professional if they continue or are bothersome):  · constipation  · diarrhea  · difficulty sleeping  · loss of appetite  · nausea  · vomiting  What may interact with this medicine?  · acyclovir or valacyclovir  · antacids  · azathioprine  · birth control pills  · certain antibiotics like ciprofloxacin and amoxicillin; clavulanic acid  · ganciclovir or valganciclovir  · lanthanum  carbonate  · medicines for cholesterol like cholestyramine and colestipol  · metronidazole  · norfloxacin  · other mycophenolate medicines  · probenecid  · rifampin  · sevelamer  · vaccines  What if I miss a dose?  If you miss a dose, take it as soon as you can. If it is almost time for your next dose, take only that dose. Do not take double or extra doses.  Where should I keep my medicine?  Keep out of the reach of children.  Store at room temperature between 15 and 30 degrees C (59 and 86 degrees F). Protect from light. Throw away any unused medicine after the expiration date.  What should I tell my health care provider before I take this medicine?  They need to know if you have any of these conditions:  · anemia or other blood disorder  · diarrhea  · immune system problems  · infection  · kidney disease  · phenylketonuria  · stomach problems  · an unusual or allergic reaction to mycophenolate mofetil, other medicines, foods, dyes, or preservatives  · pregnant or trying to get pregnant  · breast-feeding  What should I watch for while using this medicine?  Visit your doctor or health care professional for regular checks on your progress. You will need frequent blood checks during the first few months you are receiving the medicine.  Keep out of the sun. If you cannot avoid being in the sun, wear protective clothing and use sunscreen. Do not use sun lamps or tanning beds/booths.  This medicine can cause birth defects. Do not get pregnant while taking this drug. Females will need to have a negative pregnancy test before starting this medicine. If sexually active, use 2 reliable forms of birth control together for 4 weeks before starting this medicine, while you are taking this medicine, and for 6 weeks after you stop taking this medicine. Birth control pills alone may not work properly while you are taking this medicine. If you think that you might be pregnant talk to your doctor right away.  If you get a cold or  other infection while receiving this medicine, call your doctor or health care professional. Do not treat yourself. The medicine may decrease your body's ability to fight infections.  NOTE:This sheet is a summary. It may not cover all possible information. If you have questions about this medicine, talk to your doctor, pharmacist, or health care provider. Copyright© 2017 [employer]

## 2020-09-23 NOTE — PROGRESS NOTES
CC:  Chief Complaint   Patient presents with    Sarcoidosis     f/u- no pain today       History of Present Illness:  Henrietta Lai a 44 y.o.yo female  who presents for further evaluation of cutaneous sarcoidosis/ tattoo reaction  She is currently on cellcept 1000 mg po bid and prednisone 10 mg /day   She was also seen by her dermatologist Dr Lainez and given topicals  Rash over tattoos, is improving but still not gone completely  No further subcutaneous nodules noted  She was also evaluated for shortness of breath both by Pulmonary and Cardiology  Normal chest x-ray stable PFTs  She had 2D echo done by her cardiologist she see [facility] at [place] and normal per her  Occasional fatigue, no fever or weight loss    History:  In March she had COVID infection from which she recovered  When she was started on Plaquenil for COVID she developed an allergic  reaction , nodules on the skin in lower extremity and was stopped after 3 days   However she noticed recurrence of sarcoid on her tab dose in the last 2 months, nodules and lower extremities.  She denies any fever, chills, fatigue or weight loss.  She denies any respiratory symptoms like cough or shortness of breath  She was given 2 tapering courses of prednisone since June with no improvement in skin lesions   She denies any other organ involvement at this point  No joint pain or joint swelling noted.     She is a nurse at [employer] and tells me her recent ESR was 10  She also had CMP which she states was stable as well     Historical:     -Initially presented with sob and cough. Hx aspergillosus. Abnormal cxr led to bronchoscopy w diagnostic mediastinal lymph node biopsy consistent w sarcoid. Also had subcutaneous nodules on LE mostly, but also on forearms.   -Treated with prednisone approx 5 years, off since 2014.   -Was doing well until approx 1 month ago when she started developing sob and subcutaneous nodules in lower extremities. Also developed nodules on  "tattoos which is new.   -Previously tried mtx: stopped 2.2 "developed nodules in lungs?" Does not recall how long she had been on or how many tabs.  -Recently evaluated by cardiology at [facility] 2.2 new onset palpitations, chest pain, sob. EKG and echo done per pt, results unknown.   -No hx uveitis, numbness/tingling, fevers, weightloss, night sweats, cough, abdominal pain, swollen joints.   -+Dry eyes      Review of Systems:  Constitutional: Denies fever, chills. No recent weight changes.   Fatigue:  Occasional  Muscle weakness: no  Headaches: no new headaches  Eyes: No redness .  No recent visual changes.  ENT:  No oral or nasal ulcers.  Card: No chest pain.  Resp: No cough or sob.   Gastro: No nausea or vomiting.  No heartburn.  Constipation: no  Diarrhea: no  Genito:  No dysuria.  No genital ulcers.  Skin:  Tattoo reaction  Raynauds:no  Neuro: No numbness / tingling.   Psych: No depression, anxiety  Endo:  no excess thirst.  Heme: no abnormal bleeding or bruising  Clots:none   Miscarriages : none     Past Medical History:   Diagnosis Date    Arthritis     Cutaneous sarcoidosis     Hypertension 6/19/2013    Sarcoidosis     Sarcoidosis of lung        Past Surgical History:   Procedure Laterality Date    mediastinoscopy           Social History     Tobacco Use    Smoking status: Never Smoker    Smokeless tobacco: Never Used   Substance Use Topics    Alcohol use: Yes     Comment: occasionally    Drug use: No       Family History   Problem Relation Age of Onset    Sarcoidosis Brother        Review of patient's allergies indicates:   Allergen Reactions    Sulfa (sulfonamide antibiotics) Itching and Swelling    Phenytoin sodium extended Other (See Comments)           OBJECTIVE:     Vital Signs   Vitals:    09/23/20 1006   BP: (!) 142/103   Pulse: 82     Physical Exam:  General Appearance:  NAD.   Gait: not favoring.  HEENT: PERRL.  Eyes not dry or injected.  No nasal ulcers.  Mouth not dry, no oral " lesions.  Lymph: cervical, supraclavicular or axillary nodes: none abnormal   Cardio: no irregularity of S1 or S2.  No gallops or rubs.   Resp: Normal respiratory motion. Clear to auscultation bilaterally.   No abnormal chest conformation.  Abd: Soft, non-tender, nondistended.  No masses.   Skin: Head and neck,  and extremities examined    + tattoo reaction  Neuro: Ox3.   Cranial nerves II-XII grossly intact.   Sensation intact  in both distal LE and upper extremities to light touch.  Musculoskeletal Exam:    No synovitis noted  Muscle strength:Equal and full in all mm groups of the upper and lower ext.    Laboratory: I have reviewed all of the patient's relevant lab work available in the medical record and have utilized this in my evaluation and management recommendations today    I have reviewed her external labs, Ace 44 normal  CBC, CMP he normal with normal calcium levels and renal function  TB gold negative  Hep panel negative  CRP elevated 2.3    Imaging : I have reviewed all of the patient's diagnostic/imaging results available in the medical record and have utilized this in my evaluation and management recommendations today.    Chest x-ray no acute process    Notes reviewed  Other procedures:    ASSESSMENT/PLAN:     Sarcoidosis  -     mycophenolate (CELLCEPT) 500 mg Tab; Take 3 tablets (1,500 mg total) by mouth 2 (two) times daily.  Dispense: 540 tablet; Refill: 1  -     CBC auto differential; Standing  -     Comprehensive metabolic panel; Standing  -     C-Reactive Protein; Standing  -     Sedimentation rate; Standing  -     Calcitriol; Standing  -     Angiotensin Converting Enzyme; Standing  -     predniSONE (DELTASONE) 5 MG tablet; Take 1 tablet (5 mg total) by mouth once daily.  Dispense: 30 tablet; Refill: 1    Rash    Immunosuppressed status    Encounter for long-term (current) use of high-risk medication      Sarcoidosis with cutaneous involvement/tattoo flare  Improving on prednisone 10 mg a day and  CellCept 1000 mg p.o. b.i.d.  Increase CellCept to 1500 mg p.o. b.i.d.  Then decrease prednisone to 5 mg a day and if rash resolves completely decrease to 2.5 mg a day for 2 weeks and stop  Will get flu shot at work place    Compromised immune system secondary to autoimmune disease and use of immunosuppressive drugs. Monitor carefully for infections. Advised to get immediate medical care if any infection. Also advised strict adherence to age appropriate vaccinations and cancer screenings with PCP    3 month return with all 4  Labs today as ordered  Requested her to get me a copy of most recent echo to chart     Risks vs Benefits and potential side effects of medication prescribed today were discussed with patient. Medication literature given to patient up discharge  Went over uptodate information /literature on the meds prescribed today    Patient advised to hold DMARD and/or biologic therapy for signs of infection or for surgery. If you are unsure what to do please call our office for instruction. [facility] [phone]    Total time spent face to face with patient is  30  min  , More than 50 % of the time was spent counseling/coordinating care including discussing labs/imaging studies , risks/benefits of medications prescribed     Disclaimer: This note was prepared using voice recognition system and is likely to have sound alike errors and is not proof read.  Please call me with any questions.

## 2020-09-25 LAB — ACE SERPL-CCNC: 42 U/L (ref 16–85)

## 2020-09-28 LAB — 1,25(OH)2D3 SERPL-MCNC: 71 PG/ML (ref 20–79)

## 2020-10-13 ENCOUNTER — PATIENT MESSAGE (OUTPATIENT)
Dept: RHEUMATOLOGY | Facility: CLINIC | Age: 45
End: 2020-10-13

## 2020-11-24 ENCOUNTER — PATIENT MESSAGE (OUTPATIENT)
Dept: RHEUMATOLOGY | Facility: CLINIC | Age: 45
End: 2020-11-24

## 2020-12-10 ENCOUNTER — PATIENT MESSAGE (OUTPATIENT)
Dept: RHEUMATOLOGY | Facility: CLINIC | Age: 45
End: 2020-12-10

## 2020-12-11 ENCOUNTER — TELEPHONE (OUTPATIENT)
Dept: RHEUMATOLOGY | Facility: CLINIC | Age: 45
End: 2020-12-11

## 2020-12-18 ENCOUNTER — PATIENT MESSAGE (OUTPATIENT)
Dept: PULMONOLOGY | Facility: CLINIC | Age: 45
End: 2020-12-18

## 2020-12-22 ENCOUNTER — PATIENT MESSAGE (OUTPATIENT)
Dept: RHEUMATOLOGY | Facility: CLINIC | Age: 45
End: 2020-12-22

## 2020-12-22 DIAGNOSIS — D86.9 SARCOIDOSIS: Primary | ICD-10-CM

## 2020-12-23 DIAGNOSIS — D86.9 SARCOIDOSIS: Primary | ICD-10-CM

## 2020-12-23 RX ORDER — PREDNISONE 2.5 MG/1
2.5 TABLET ORAL DAILY
Qty: 14 TABLET | Refills: 0 | Status: SHIPPED | OUTPATIENT
Start: 2020-12-23 | End: 2022-07-11

## 2020-12-23 NOTE — PROGRESS NOTES
Pt called , since rash resolved we decreased her cellcept to 1000 mg bid , she is still taking prednisone 5 mg / day   Mild elevated calcitriol , not on any vit d supp  No new symptoms    Increase cellcept back to 1500 mg bid and decrease prednisone to 2.5 mg / day and stop     Thanks

## 2021-02-01 ENCOUNTER — TELEPHONE (OUTPATIENT)
Dept: RHEUMATOLOGY | Facility: CLINIC | Age: 46
End: 2021-02-01

## 2021-02-03 ENCOUNTER — LAB VISIT (OUTPATIENT)
Dept: LAB | Facility: HOSPITAL | Age: 46
End: 2021-02-03
Attending: INTERNAL MEDICINE
Payer: COMMERCIAL

## 2021-02-03 DIAGNOSIS — D86.9 SARCOIDOSIS: ICD-10-CM

## 2021-02-03 LAB
ALBUMIN SERPL BCP-MCNC: 4.1 G/DL (ref 3.5–5.2)
ALP SERPL-CCNC: 51 U/L (ref 55–135)
ALT SERPL W/O P-5'-P-CCNC: 19 U/L (ref 10–44)
ANION GAP SERPL CALC-SCNC: 9 MMOL/L (ref 8–16)
AST SERPL-CCNC: 16 U/L (ref 10–40)
BASOPHILS # BLD AUTO: 0.04 K/UL (ref 0–0.2)
BASOPHILS NFR BLD: 0.9 % (ref 0–1.9)
BILIRUB SERPL-MCNC: 0.6 MG/DL (ref 0.1–1)
BUN SERPL-MCNC: 18 MG/DL (ref 6–20)
CALCIUM SERPL-MCNC: 9.1 MG/DL (ref 8.7–10.5)
CHLORIDE SERPL-SCNC: 106 MMOL/L (ref 95–110)
CO2 SERPL-SCNC: 24 MMOL/L (ref 23–29)
CREAT SERPL-MCNC: 0.8 MG/DL (ref 0.5–1.4)
CRP SERPL-MCNC: 4.3 MG/L (ref 0–8.2)
DIFFERENTIAL METHOD: NORMAL
EOSINOPHIL # BLD AUTO: 0.1 K/UL (ref 0–0.5)
EOSINOPHIL NFR BLD: 2.1 % (ref 0–8)
ERYTHROCYTE [DISTWIDTH] IN BLOOD BY AUTOMATED COUNT: 12.4 % (ref 11.5–14.5)
ERYTHROCYTE [SEDIMENTATION RATE] IN BLOOD BY WESTERGREN METHOD: 8 MM/HR (ref 0–20)
EST. GFR  (AFRICAN AMERICAN): >60 ML/MIN/1.73 M^2
EST. GFR  (NON AFRICAN AMERICAN): >60 ML/MIN/1.73 M^2
GLUCOSE SERPL-MCNC: 93 MG/DL (ref 70–110)
HCT VFR BLD AUTO: 39.6 % (ref 37–48.5)
HGB BLD-MCNC: 13.6 G/DL (ref 12–16)
IMM GRANULOCYTES # BLD AUTO: 0.01 K/UL (ref 0–0.04)
IMM GRANULOCYTES NFR BLD AUTO: 0.2 % (ref 0–0.5)
LYMPHOCYTES # BLD AUTO: 1.6 K/UL (ref 1–4.8)
LYMPHOCYTES NFR BLD: 37.2 % (ref 18–48)
MCH RBC QN AUTO: 29.8 PG (ref 27–31)
MCHC RBC AUTO-ENTMCNC: 34.3 G/DL (ref 32–36)
MCV RBC AUTO: 87 FL (ref 82–98)
MONOCYTES # BLD AUTO: 0.6 K/UL (ref 0.3–1)
MONOCYTES NFR BLD: 13.3 % (ref 4–15)
NEUTROPHILS # BLD AUTO: 2 K/UL (ref 1.8–7.7)
NEUTROPHILS NFR BLD: 46.3 % (ref 38–73)
NRBC BLD-RTO: 0 /100 WBC
PLATELET # BLD AUTO: 211 K/UL (ref 150–350)
PMV BLD AUTO: 11.2 FL (ref 9.2–12.9)
POTASSIUM SERPL-SCNC: 3.7 MMOL/L (ref 3.5–5.1)
PROT SERPL-MCNC: 7.4 G/DL (ref 6–8.4)
RBC # BLD AUTO: 4.57 M/UL (ref 4–5.4)
SODIUM SERPL-SCNC: 139 MMOL/L (ref 136–145)
WBC # BLD AUTO: 4.36 K/UL (ref 3.9–12.7)

## 2021-02-03 PROCEDURE — 86140 C-REACTIVE PROTEIN: CPT

## 2021-02-03 PROCEDURE — 36415 COLL VENOUS BLD VENIPUNCTURE: CPT

## 2021-02-03 PROCEDURE — 85651 RBC SED RATE NONAUTOMATED: CPT

## 2021-02-03 PROCEDURE — 80053 COMPREHEN METABOLIC PANEL: CPT

## 2021-02-03 PROCEDURE — 85025 COMPLETE CBC W/AUTO DIFF WBC: CPT

## 2021-02-03 PROCEDURE — 82652 VIT D 1 25-DIHYDROXY: CPT

## 2021-02-03 PROCEDURE — 82164 ANGIOTENSIN I ENZYME TEST: CPT

## 2021-02-05 LAB — ACE SERPL-CCNC: 53 U/L (ref 16–85)

## 2021-02-08 LAB — 1,25(OH)2D3 SERPL-MCNC: 72 PG/ML (ref 20–79)

## 2021-02-12 ENCOUNTER — TELEPHONE (OUTPATIENT)
Dept: RHEUMATOLOGY | Facility: CLINIC | Age: 46
End: 2021-02-12

## 2021-02-25 ENCOUNTER — PATIENT MESSAGE (OUTPATIENT)
Dept: RHEUMATOLOGY | Facility: CLINIC | Age: 46
End: 2021-02-25

## 2021-03-10 ENCOUNTER — PATIENT MESSAGE (OUTPATIENT)
Dept: RHEUMATOLOGY | Facility: CLINIC | Age: 46
End: 2021-03-10

## 2021-05-07 ENCOUNTER — OFFICE VISIT (OUTPATIENT)
Dept: RHEUMATOLOGY | Facility: CLINIC | Age: 46
End: 2021-05-07
Payer: COMMERCIAL

## 2021-05-07 VITALS
SYSTOLIC BLOOD PRESSURE: 143 MMHG | HEART RATE: 77 BPM | DIASTOLIC BLOOD PRESSURE: 103 MMHG | WEIGHT: 250 LBS | BODY MASS INDEX: 34.87 KG/M2

## 2021-05-07 DIAGNOSIS — D86.0 PULMONARY SARCOIDOSIS: ICD-10-CM

## 2021-05-07 DIAGNOSIS — D86.3 CUTANEOUS SARCOIDOSIS: Primary | ICD-10-CM

## 2021-05-07 DIAGNOSIS — D84.9 IMMUNOSUPPRESSED STATUS: ICD-10-CM

## 2021-05-07 DIAGNOSIS — Z79.899 ENCOUNTER FOR LONG-TERM (CURRENT) USE OF HIGH-RISK MEDICATION: ICD-10-CM

## 2021-05-07 PROCEDURE — 99214 PR OFFICE/OUTPT VISIT, EST, LEVL IV, 30-39 MIN: ICD-10-PCS | Mod: S$GLB,,, | Performed by: INTERNAL MEDICINE

## 2021-05-07 PROCEDURE — 99999 PR PBB SHADOW E&M-EST. PATIENT-LVL IV: ICD-10-PCS | Mod: PBBFAC,,, | Performed by: INTERNAL MEDICINE

## 2021-05-07 PROCEDURE — 99214 OFFICE O/P EST MOD 30 MIN: CPT | Mod: S$GLB,,, | Performed by: INTERNAL MEDICINE

## 2021-05-07 PROCEDURE — 99999 PR PBB SHADOW E&M-EST. PATIENT-LVL IV: CPT | Mod: PBBFAC,,, | Performed by: INTERNAL MEDICINE

## 2021-05-07 RX ORDER — MUPIROCIN 20 MG/G
OINTMENT TOPICAL
COMMUNITY
Start: 2021-02-05 | End: 2022-07-11

## 2021-05-07 RX ORDER — METHOCARBAMOL 750 MG/1
TABLET, FILM COATED ORAL
COMMUNITY
Start: 2021-02-19 | End: 2023-01-11

## 2021-05-07 RX ORDER — CITALOPRAM 20 MG/1
20 TABLET, FILM COATED ORAL DAILY
COMMUNITY
Start: 2021-02-18

## 2021-06-02 ENCOUNTER — PATIENT MESSAGE (OUTPATIENT)
Dept: RHEUMATOLOGY | Facility: CLINIC | Age: 46
End: 2021-06-02

## 2021-08-03 ENCOUNTER — TELEPHONE (OUTPATIENT)
Dept: RHEUMATOLOGY | Facility: CLINIC | Age: 46
End: 2021-08-03

## 2021-08-03 ENCOUNTER — PATIENT MESSAGE (OUTPATIENT)
Dept: RHEUMATOLOGY | Facility: CLINIC | Age: 46
End: 2021-08-03

## 2021-08-03 DIAGNOSIS — Z79.899 ENCOUNTER FOR LONG-TERM (CURRENT) USE OF HIGH-RISK MEDICATION: ICD-10-CM

## 2021-08-03 DIAGNOSIS — D86.3 CUTANEOUS SARCOIDOSIS: Primary | ICD-10-CM

## 2021-08-03 DIAGNOSIS — D86.0 PULMONARY SARCOIDOSIS: ICD-10-CM

## 2021-08-03 DIAGNOSIS — D84.9 IMMUNOSUPPRESSED STATUS: ICD-10-CM

## 2021-08-04 ENCOUNTER — PATIENT MESSAGE (OUTPATIENT)
Dept: OPHTHALMOLOGY | Facility: CLINIC | Age: 46
End: 2021-08-04

## 2021-08-09 ENCOUNTER — OFFICE VISIT (OUTPATIENT)
Dept: OPHTHALMOLOGY | Facility: CLINIC | Age: 46
End: 2021-08-09
Payer: COMMERCIAL

## 2021-08-09 DIAGNOSIS — D86.9 SARCOIDOSIS: Primary | ICD-10-CM

## 2021-08-09 DIAGNOSIS — R68.2 DRY MOUTH: ICD-10-CM

## 2021-08-09 DIAGNOSIS — H04.129 DRY EYE: ICD-10-CM

## 2021-08-09 DIAGNOSIS — H52.7 REFRACTIVE ERROR: ICD-10-CM

## 2021-08-09 PROCEDURE — 92014 COMPRE OPH EXAM EST PT 1/>: CPT | Mod: S$GLB,,, | Performed by: OPHTHALMOLOGY

## 2021-08-09 PROCEDURE — 92015 DETERMINE REFRACTIVE STATE: CPT | Mod: S$GLB,,, | Performed by: OPHTHALMOLOGY

## 2021-08-09 PROCEDURE — 99999 PR PBB SHADOW E&M-EST. PATIENT-LVL III: ICD-10-PCS | Mod: PBBFAC,,, | Performed by: OPHTHALMOLOGY

## 2021-08-09 PROCEDURE — 92014 PR EYE EXAM, EST PATIENT,COMPREHESV: ICD-10-PCS | Mod: S$GLB,,, | Performed by: OPHTHALMOLOGY

## 2021-08-09 PROCEDURE — 99999 PR PBB SHADOW E&M-EST. PATIENT-LVL III: CPT | Mod: PBBFAC,,, | Performed by: OPHTHALMOLOGY

## 2021-08-09 PROCEDURE — 92015 PR REFRACTION: ICD-10-PCS | Mod: S$GLB,,, | Performed by: OPHTHALMOLOGY

## 2021-08-19 ENCOUNTER — PATIENT MESSAGE (OUTPATIENT)
Dept: RHEUMATOLOGY | Facility: CLINIC | Age: 46
End: 2021-08-19

## 2021-08-19 DIAGNOSIS — D86.9 SARCOIDOSIS: ICD-10-CM

## 2021-08-19 RX ORDER — MYCOPHENOLATE MOFETIL 500 MG/1
1500 TABLET ORAL 2 TIMES DAILY
Qty: 180 TABLET | Refills: 0 | Status: SHIPPED | OUTPATIENT
Start: 2021-08-19 | End: 2022-07-11

## 2021-08-20 ENCOUNTER — PATIENT MESSAGE (OUTPATIENT)
Dept: RHEUMATOLOGY | Facility: CLINIC | Age: 46
End: 2021-08-20

## 2021-08-23 ENCOUNTER — TELEPHONE (OUTPATIENT)
Dept: RHEUMATOLOGY | Facility: CLINIC | Age: 46
End: 2021-08-23

## 2021-08-23 ENCOUNTER — DOCUMENTATION ONLY (OUTPATIENT)
Dept: RHEUMATOLOGY | Facility: CLINIC | Age: 46
End: 2021-08-23

## 2021-08-26 ENCOUNTER — PATIENT MESSAGE (OUTPATIENT)
Dept: RHEUMATOLOGY | Facility: CLINIC | Age: 46
End: 2021-08-26

## 2021-08-26 ENCOUNTER — TELEPHONE (OUTPATIENT)
Dept: RHEUMATOLOGY | Facility: CLINIC | Age: 46
End: 2021-08-26

## 2021-11-15 ENCOUNTER — TELEPHONE (OUTPATIENT)
Dept: RHEUMATOLOGY | Facility: CLINIC | Age: 46
End: 2021-11-15
Payer: COMMERCIAL

## 2021-11-17 ENCOUNTER — TELEPHONE (OUTPATIENT)
Dept: RHEUMATOLOGY | Facility: CLINIC | Age: 46
End: 2021-11-17
Payer: COMMERCIAL

## 2021-12-20 ENCOUNTER — TELEPHONE (OUTPATIENT)
Dept: RHEUMATOLOGY | Facility: CLINIC | Age: 46
End: 2021-12-20
Payer: COMMERCIAL

## 2021-12-21 ENCOUNTER — OFFICE VISIT (OUTPATIENT)
Dept: RHEUMATOLOGY | Facility: CLINIC | Age: 46
End: 2021-12-21
Payer: COMMERCIAL

## 2021-12-21 ENCOUNTER — PATIENT MESSAGE (OUTPATIENT)
Dept: RHEUMATOLOGY | Facility: CLINIC | Age: 46
End: 2021-12-21
Payer: COMMERCIAL

## 2021-12-21 DIAGNOSIS — D86.9 SARCOIDOSIS: Primary | ICD-10-CM

## 2021-12-21 DIAGNOSIS — Z79.899 ENCOUNTER FOR LONG-TERM (CURRENT) USE OF HIGH-RISK MEDICATION: ICD-10-CM

## 2021-12-21 DIAGNOSIS — D86.0 PULMONARY SARCOIDOSIS: ICD-10-CM

## 2021-12-21 DIAGNOSIS — D84.9 IMMUNOSUPPRESSED STATUS: ICD-10-CM

## 2021-12-21 PROCEDURE — 99214 PR OFFICE/OUTPT VISIT, EST, LEVL IV, 30-39 MIN: ICD-10-PCS | Mod: 95,,, | Performed by: INTERNAL MEDICINE

## 2021-12-21 PROCEDURE — 99214 OFFICE O/P EST MOD 30 MIN: CPT | Mod: 95,,, | Performed by: INTERNAL MEDICINE

## 2022-01-06 ENCOUNTER — DOCUMENTATION ONLY (OUTPATIENT)
Dept: RHEUMATOLOGY | Facility: CLINIC | Age: 47
End: 2022-01-06
Payer: COMMERCIAL

## 2022-01-31 ENCOUNTER — TELEPHONE (OUTPATIENT)
Dept: RHEUMATOLOGY | Facility: CLINIC | Age: 47
End: 2022-01-31
Payer: COMMERCIAL

## 2022-01-31 NOTE — TELEPHONE ENCOUNTER
----- Message from Sim Senior LPN sent at 1/6/2022  7:41 AM CST -----  12.29.21 Angiot con enz scanned under media

## 2022-06-22 NOTE — LETTER
December 11, 2018      Susanne Villalobos MD  [address]  [facility] [address]           [facility]  [address] [place] [address]  Phone: [phone]  Fax: [phone]          Patient: Henrietta Parada   MR Number: [id number]   YOB: 1975   Date of Visit: 12/11/2018       Dear Dr. Susanne Villalobos:    Thank you for referring Henrietta Parada to me for evaluation. Attached you will find relevant portions of my assessment and plan of care.    If you have questions, please do not hesitate to call me. I look forward to following Henrietta Parada along with you.    Sincerely,    Parveen Cutler MD    Enclosure  CC:  No Recipients    If you would like to receive this communication electronically, please contact [email or web address] or [phone] to request more information on [facility] access.    For providers and/or their staff who would like to refer a patient to [facility], please contact us through our one-stop-shop provider referral line, [facility], at [phone].    If you feel you have received this communication in error or would no longer like to receive these types of communications, please e-mail [email or web address]          Size Of Lesion In Cm: 0

## 2022-07-05 ENCOUNTER — TELEPHONE (OUTPATIENT)
Dept: RHEUMATOLOGY | Facility: CLINIC | Age: 47
End: 2022-07-05
Payer: COMMERCIAL

## 2022-07-05 NOTE — TELEPHONE ENCOUNTER
----- Message from Mel Schneider sent at 7/5/2022  9:25 AM CDT -----  Contact: Pt Mobile  [phone]  Caller is requesting an earlier appointment then we can schedule.  Caller is requesting a message be sent to the provider.  If this is for urgent care symptoms, did you offer other providers at this location, providers at other locations, or [facility]? (yes, no, n/a):  N/A  If this is for the patients physical, did you offer to schedule next available and put on wait list, or to see NP or PA for their physical?  (yes, no, n/a):  N/A  When is the next available appointment with their provider: 11/09/2022  Reason for the appointment:  Patient have body aches.   Patient preference of timeframe to be scheduled:  As soon as possible.  Would the patient like a call back, or a response through their [facility] portal?:  Either

## 2022-07-11 ENCOUNTER — OFFICE VISIT (OUTPATIENT)
Dept: RHEUMATOLOGY | Facility: CLINIC | Age: 47
End: 2022-07-11
Payer: COMMERCIAL

## 2022-07-11 ENCOUNTER — HOSPITAL ENCOUNTER (OUTPATIENT)
Dept: RADIOLOGY | Facility: HOSPITAL | Age: 47
Discharge: HOME | End: 2022-07-11
Attending: PHYSICIAN ASSISTANT
Payer: COMMERCIAL

## 2022-07-11 ENCOUNTER — PATIENT MESSAGE (OUTPATIENT)
Dept: PULMONOLOGY | Facility: CLINIC | Age: 47
End: 2022-07-11
Payer: COMMERCIAL

## 2022-07-11 VITALS
DIASTOLIC BLOOD PRESSURE: 122 MMHG | HEART RATE: 90 BPM | HEIGHT: 71 IN | BODY MASS INDEX: 37.62 KG/M2 | WEIGHT: 268.75 LBS | SYSTOLIC BLOOD PRESSURE: 178 MMHG

## 2022-07-11 DIAGNOSIS — D86.0 PULMONARY SARCOIDOSIS: ICD-10-CM

## 2022-07-11 DIAGNOSIS — Z79.899 ENCOUNTER FOR LONG-TERM (CURRENT) USE OF HIGH-RISK MEDICATION: ICD-10-CM

## 2022-07-11 DIAGNOSIS — D84.9 IMMUNOSUPPRESSED STATUS: ICD-10-CM

## 2022-07-11 DIAGNOSIS — D86.9 SARCOIDOSIS: ICD-10-CM

## 2022-07-11 DIAGNOSIS — D86.9 SARCOIDOSIS: Primary | ICD-10-CM

## 2022-07-11 PROCEDURE — 3077F SYST BP >= 140 MM HG: CPT | Mod: CPTII,S$GLB,, | Performed by: PHYSICIAN ASSISTANT

## 2022-07-11 PROCEDURE — 99999 PR PBB SHADOW E&M-EST. PATIENT-LVL V: ICD-10-PCS | Mod: PBBFAC,,, | Performed by: PHYSICIAN ASSISTANT

## 2022-07-11 PROCEDURE — 3008F PR BODY MASS INDEX (BMI) DOCUMENTED: ICD-10-PCS | Mod: CPTII,S$GLB,, | Performed by: PHYSICIAN ASSISTANT

## 2022-07-11 PROCEDURE — 71046 X-RAY EXAM CHEST 2 VIEWS: CPT | Mod: TC

## 2022-07-11 PROCEDURE — 99999 PR PBB SHADOW E&M-EST. PATIENT-LVL V: CPT | Mod: PBBFAC,,, | Performed by: PHYSICIAN ASSISTANT

## 2022-07-11 PROCEDURE — 71046 X-RAY EXAM CHEST 2 VIEWS: CPT | Mod: 26,,, | Performed by: RADIOLOGY

## 2022-07-11 PROCEDURE — 1159F MED LIST DOCD IN RCRD: CPT | Mod: CPTII,S$GLB,, | Performed by: PHYSICIAN ASSISTANT

## 2022-07-11 PROCEDURE — 1159F PR MEDICATION LIST DOCUMENTED IN MEDICAL RECORD: ICD-10-PCS | Mod: CPTII,S$GLB,, | Performed by: PHYSICIAN ASSISTANT

## 2022-07-11 PROCEDURE — 3008F BODY MASS INDEX DOCD: CPT | Mod: CPTII,S$GLB,, | Performed by: PHYSICIAN ASSISTANT

## 2022-07-11 PROCEDURE — 3077F PR MOST RECENT SYSTOLIC BLOOD PRESSURE >= 140 MM HG: ICD-10-PCS | Mod: CPTII,S$GLB,, | Performed by: PHYSICIAN ASSISTANT

## 2022-07-11 PROCEDURE — 99214 OFFICE O/P EST MOD 30 MIN: CPT | Mod: S$GLB,,, | Performed by: PHYSICIAN ASSISTANT

## 2022-07-11 PROCEDURE — 3080F DIAST BP >= 90 MM HG: CPT | Mod: CPTII,S$GLB,, | Performed by: PHYSICIAN ASSISTANT

## 2022-07-11 PROCEDURE — 71046 XR CHEST PA AND LATERAL: ICD-10-PCS | Mod: 26,,, | Performed by: RADIOLOGY

## 2022-07-11 PROCEDURE — 3080F PR MOST RECENT DIASTOLIC BLOOD PRESSURE >= 90 MM HG: ICD-10-PCS | Mod: CPTII,S$GLB,, | Performed by: PHYSICIAN ASSISTANT

## 2022-07-11 PROCEDURE — 99214 PR OFFICE/OUTPT VISIT, EST, LEVL IV, 30-39 MIN: ICD-10-PCS | Mod: S$GLB,,, | Performed by: PHYSICIAN ASSISTANT

## 2022-07-11 RX ORDER — SPIRONOLACTONE 25 MG/1
25 TABLET ORAL DAILY
COMMUNITY
Start: 2022-05-17

## 2022-07-11 RX ORDER — CARVEDILOL 12.5 MG/1
1 TABLET ORAL 2 TIMES DAILY
COMMUNITY
Start: 2021-10-07

## 2022-07-11 RX ORDER — FUROSEMIDE 20 MG/1
20 TABLET ORAL EVERY OTHER DAY
COMMUNITY
Start: 2022-06-08

## 2022-07-11 RX ORDER — CYCLOBENZAPRINE HCL 10 MG
10 TABLET ORAL EVERY 8 HOURS PRN
COMMUNITY
Start: 2022-05-25

## 2022-07-11 RX ORDER — METAXALONE 800 MG/1
800 TABLET ORAL 3 TIMES DAILY
COMMUNITY
Start: 2022-06-29 | End: 2023-01-11

## 2022-07-11 RX ORDER — ACETAMINOPHEN AND CODEINE PHOSPHATE 300; 30 MG/1; MG/1
.5-1 TABLET ORAL 2 TIMES DAILY PRN
COMMUNITY
Start: 2022-06-30

## 2022-07-11 NOTE — PROGRESS NOTES
Subjective:      Patient ID: Henrietta Parada is a 46 y.o. female.    Chief Complaint: Sarcoidosis      HPI   Henrietta Parada  is a 46 y.o. female who is established in the department, but a new patient to my clinic.  She has history of sarcoidosis with pulmonary and cutaneous involvement.  She has no cutaneous flares.  She relates history of shortness of breath which is normal for her.  It is stable and not progressive.  Also relates that it initially got worse after COVID in 2020. Has seen pulmonology in the past, but has not followed with them recently.    Was previously treated with prednisone and then started CellCept as a steroid sparing agent.  They were able to stop taper off of the CellCept and she was considered to be in remission.  She has not had labs done in about a year for sarcoidosis.    Overall feels stable but complains of widespread pain.  She sees Interventional [facility] Management at [facility].  She has had back and neck MRIs.  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    Pain is mainly mechanical pattern as well as some mild start-up pain which improves after 10 or 15 minutes.      Patient denies fevers, chills, photosensitivity, eye pain, shortness of breath, chest pain, hematuria, blood in the stool, rash, sicca symptoms, raynauds, finger ulcerations.  Rheumatologic systems otherwise negative.    Serologies/Labs:  · Previously elevated ACE - most recently WNL  Current Treatment:  · None   Previous Treatment:   · Steroids  · cellcept  · betamethosone cream      Current Outpatient Medications:     acetaminophen-codeine 300-30mg (TYLENOL #3) 300-30 mg Tab, Take 0.5-1 tablets by mouth 2 (two) times daily as needed., Disp: , Rfl:     amLODIPine (NORVASC) 10 MG tablet, Take 10 mg by mouth once daily., Disp: , Rfl:     aspirin (ECOTRIN) 81 MG EC tablet, Take 81 mg by mouth once daily., Disp: , Rfl:     atorvastatin (LIPITOR) 20 MG tablet, Take  20 mg by mouth once daily., Disp: , Rfl:     carvediloL (COREG) 12.5 MG tablet, Take 1 tablet by mouth 2 (two) times a day., Disp: , Rfl:     citalopram (CELEXA) 20 MG tablet, Take 20 mg by mouth once daily., Disp: , Rfl:     cyclobenzaprine (FLEXERIL) 10 MG tablet, Take 10 mg by mouth every 8 (eight) hours as needed., Disp: , Rfl:     furosemide (LASIX) 20 MG tablet, Take 20 mg by mouth every other day., Disp: , Rfl:     gabapentin (NEURONTIN) 300 MG capsule, Take 300 mg by mouth 3 (three) times daily., Disp: , Rfl:     losartan-hydrochlorothiazide 100-25 mg (HYZAAR) 100-25 mg per tablet, Take 1 tablet by mouth once daily., Disp: , Rfl:     melatonin 1 mg Tab, Take by mouth. , Disp: , Rfl:     meloxicam (MOBIC) 15 MG tablet, Take 15 mg by mouth once daily., Disp: , Rfl: 3    metaxalone (SKELAXIN) 800 MG tablet, Take 800 mg by mouth 3 (three) times daily., Disp: , Rfl:     methocarbamoL (ROBAXIN) 750 MG Tab, TAKE ONE TABLET BY MOUTH EVERY 8 HOURS FOR MUSCLE SPASM, Disp: , Rfl:     pantoprazole (PROTONIX) 40 MG tablet, Take 40 mg by mouth once daily., Disp: , Rfl:     spironolactone (ALDACTONE) 25 MG tablet, Take 25 mg by mouth once daily., Disp: , Rfl:     TARINA FE 1-20 EQ, 28, 1 mg-20 mcg (21)/75 mg (7) per tablet, Take 1 tablet by mouth once daily., Disp: , Rfl:     meclizine (ANTIVERT) 25 mg tablet, Take 25 mg by mouth 3 (three) times daily as needed., Disp: , Rfl:     Past Medical History:   Diagnosis Date    Arthritis     Cutaneous sarcoidosis     Hypertension 6/19/2013    Pulmonary sarcoidosis     Sarcoidosis     Sarcoidosis of lung      Family History   Problem Relation Age of Onset    Sarcoidosis Brother      Social History     Socioeconomic History    Marital status:    Tobacco Use    Smoking status: Never Smoker    Smokeless tobacco: Never Used   Substance and Sexual Activity    Alcohol use: Yes     Comment: occasionally    Drug use: No    Sexual activity: Never     Birth  "control/protection: None     Review of patient's allergies indicates:   Allergen Reactions    Sulfa (sulfonamide antibiotics) Itching and Swelling       Objective:   BP (!) 178/122 (BP Location: Right arm, Patient Position: Sitting, BP Method: Medium (Manual))   Pulse 90   Ht 5' 11" (1.803 m)   Wt 121.9 kg (268 lb 11.9 oz)   BMI 37.48 kg/m²   Immunization History   Administered Date(s) Administered    DTP 03/12/1975, 08/06/1975, 12/03/1975    Influenza 10/01/2018    Influenza A (H1N1) 2009 Monovalent - IM - PF 11/06/2009    MMR 08/22/1979    Measles / Rubella 10/01/1975    OPV 03/12/1975, 08/06/1975, 12/03/1975, 08/22/1979    Pneumococcal Conjugate - 13 Valent 11/12/2018    Pneumococcal Polysaccharide - 23 Valent 07/22/2020    Td (ADULT) 08/22/1979, 10/12/1999       Physical Exam   Constitutional: She is oriented to person, place, and time. No distress.   HENT:   Head: Normocephalic and atraumatic.   Pulmonary/Chest: Effort normal.   Abdominal: She exhibits no distension.   Musculoskeletal:         General: No swelling or tenderness. Normal range of motion.      Cervical back: Normal range of motion.   Lymphadenopathy:     She has no cervical adenopathy.   Neurological: She is alert and oriented to person, place, and time.   Skin: Skin is warm and dry. No rash noted.   Psychiatric: Mood normal.   Nursing note and vitals reviewed.  No synovitis, no enthesitis, no dactylitis  No acute distress    Recent Results (from the past 672 hour(s))   CBC Auto Differential    Collection Time: 07/11/22  2:15 PM   Result Value Ref Range    WBC 4.40 3.90 - 12.70 K/uL    RBC 4.33 4.00 - 5.40 M/uL    Hemoglobin 13.1 12.0 - 16.0 g/dL    Hematocrit 37.6 37.0 - 48.5 %    MCV 87 82 - 98 fL    MCH 30.3 27.0 - 31.0 pg    MCHC 34.8 32.0 - 36.0 g/dL    RDW 13.0 11.5 - 14.5 %    Platelets 237 150 - 450 K/uL    MPV 11.0 9.2 - 12.9 fL    Immature Granulocytes 0.2 0.0 - 0.5 %    Gran # (ANC) 2.3 1.8 - 7.7 K/uL    Immature Grans " (Abs) 0.01 0.00 - 0.04 K/uL    Lymph # 1.5 1.0 - 4.8 K/uL    Mono # 0.5 0.3 - 1.0 K/uL    Eos # 0.1 0.0 - 0.5 K/uL    Baso # 0.02 0.00 - 0.20 K/uL    nRBC 0 0 /100 WBC    Gran % 52.2 38.0 - 73.0 %    Lymph % 34.8 18.0 - 48.0 %    Mono % 10.9 4.0 - 15.0 %    Eosinophil % 1.4 0.0 - 8.0 %    Basophil % 0.5 0.0 - 1.9 %    Differential Method Automated    Comprehensive Metabolic Panel    Collection Time: 07/11/22  2:15 PM   Result Value Ref Range    Sodium 139 136 - 145 mmol/L    Potassium 4.2 3.5 - 5.1 mmol/L    Chloride 107 95 - 110 mmol/L    CO2 25 23 - 29 mmol/L    Glucose 101 70 - 110 mg/dL    BUN 11 6 - 20 mg/dL    Creatinine 0.9 0.5 - 1.4 mg/dL    Calcium 9.0 8.7 - 10.5 mg/dL    Total Protein 7.0 6.0 - 8.4 g/dL    Albumin 3.5 3.5 - 5.2 g/dL    Total Bilirubin 0.5 0.1 - 1.0 mg/dL    Alkaline Phosphatase 47 (L) 55 - 135 U/L    AST 23 10 - 40 U/L    ALT 25 10 - 44 U/L    Anion Gap 7 (L) 8 - 16 mmol/L    eGFR if African American >60 >60 mL/min/1.73 m^2    eGFR if non African American >60 >60 mL/min/1.73 m^2   C-Reactive Protein    Collection Time: 07/11/22  2:15 PM   Result Value Ref Range    CRP 11.6 (H) 0.0 - 8.2 mg/L         Lab Results   Component Value Date    TBGOLDPLUS Negative 10/03/2018      Lab Results   Component Value Date    HEPAIGM Negative 10/03/2018    HEPBIGM Negative 10/03/2018    HEPBCAB Negative 12/20/2018    HEPCAB Negative 10/03/2018      I have personally reviewed chest x-ray from today and agree with the radiologist interpretation  X-Ray Chest PA And Lateral  Narrative: EXAMINATION:  XR CHEST PA AND LATERAL    CLINICAL HISTORY:  Sarcoidosis, unspecified    TECHNIQUE:  PA and lateral views of the chest were performed.    COMPARISON:  Prior radiographs    FINDINGS:  Cardiac silhouette and mediastinal contours are normal.  Lungs are clear.  Osseous structures are intact.  Impression: No acute cardiopulmonary process.    Electronically signed by: Trent Parsons,  MD  Date:    07/11/2022  Time:    14:24        Assessment:     1. Sarcoidosis    2. Pulmonary sarcoidosis    3. Immunosuppressed status    4. Encounter for long-term (current) use of high-risk medication            Plan:     Henrietta was seen today for sarcoidosis.    Diagnoses and all orders for this visit:    Sarcoidosis  -     Angiotensin Converting Enzyme; Standing  -     CBC Auto Differential; Standing  -     Comprehensive Metabolic Panel; Standing  -     Sedimentation rate; Standing  -     C-Reactive Protein; Standing  -     X-Ray Chest PA And Lateral; Future    Pulmonary sarcoidosis  -     X-Ray Chest PA And Lateral; Future  -     Ambulatory referral/consult to Pulmonology; Future    Immunosuppressed status    Encounter for long-term (current) use of high-risk medication        · Sarcoidosis w h/o pulmonary involvement  · Re-establish care w Pulmonology  · SOB stable  · Labs today  · CXR today  · Consider readdition of steroids/cellcept if signs of active disease  · If abnormalities in above, will see sooner than 6 mos.  · HTN - quite high today  · asympotomatic  · Cardiology dept called -   · no providers had availability to see patient.    · They recommended pt go to ER - related this to the patient  · Compromised immune system secondary to autoimmune disease and/or use of immunosuppressive drugs.  Monitor carefully for infections.  Advised patient to get immediate medical care if any infection arises.  Also advised strict adherence age-appropriate vaccinations and cancer screenings with PCP.  · Return to clinic: 6mos w ACE and Reg 4 one week prior    Follow up in about 6 months (around 1/11/2023).    The patient understands, chooses and consents to this plan and accepts all   the risks which include but are not limited to the risks mentioned above.     Disclaimer: This note was prepared using a voice recognition system and is likely to have sound alike errors within the text.

## 2022-07-13 ENCOUNTER — PATIENT MESSAGE (OUTPATIENT)
Dept: RHEUMATOLOGY | Facility: CLINIC | Age: 47
End: 2022-07-13
Payer: COMMERCIAL

## 2022-07-14 NOTE — TELEPHONE ENCOUNTER
"See result note from yesterday 07/13/2022.      Zayra Ley (Allye), [facility]  Rheumatology Department   "

## 2022-08-12 ENCOUNTER — LAB VISIT (OUTPATIENT)
Dept: LAB | Facility: HOSPITAL | Age: 47
End: 2022-08-12
Attending: PHYSICIAN ASSISTANT
Payer: COMMERCIAL

## 2022-08-12 DIAGNOSIS — D86.9 SARCOIDOSIS: ICD-10-CM

## 2022-08-12 LAB
ALBUMIN SERPL BCP-MCNC: 3.9 G/DL (ref 3.5–5.2)
ALP SERPL-CCNC: 54 U/L (ref 55–135)
ALT SERPL W/O P-5'-P-CCNC: 37 U/L (ref 10–44)
ANION GAP SERPL CALC-SCNC: 11 MMOL/L (ref 8–16)
AST SERPL-CCNC: 25 U/L (ref 10–40)
BASOPHILS # BLD AUTO: 0.02 K/UL (ref 0–0.2)
BASOPHILS NFR BLD: 0.4 % (ref 0–1.9)
BILIRUB SERPL-MCNC: 0.4 MG/DL (ref 0.1–1)
BUN SERPL-MCNC: 16 MG/DL (ref 6–20)
CALCIUM SERPL-MCNC: 9.2 MG/DL (ref 8.7–10.5)
CHLORIDE SERPL-SCNC: 103 MMOL/L (ref 95–110)
CO2 SERPL-SCNC: 24 MMOL/L (ref 23–29)
CREAT SERPL-MCNC: 1.2 MG/DL (ref 0.5–1.4)
CRP SERPL-MCNC: 6.4 MG/L (ref 0–8.2)
DIFFERENTIAL METHOD: NORMAL
EOSINOPHIL # BLD AUTO: 0.1 K/UL (ref 0–0.5)
EOSINOPHIL NFR BLD: 2.3 % (ref 0–8)
ERYTHROCYTE [DISTWIDTH] IN BLOOD BY AUTOMATED COUNT: 12.9 % (ref 11.5–14.5)
ERYTHROCYTE [SEDIMENTATION RATE] IN BLOOD BY WESTERGREN METHOD: 6 MM/HR (ref 0–20)
EST. GFR  (NO RACE VARIABLE): 56 ML/MIN/1.73 M^2
GLUCOSE SERPL-MCNC: 90 MG/DL (ref 70–110)
HCT VFR BLD AUTO: 39.2 % (ref 37–48.5)
HGB BLD-MCNC: 13.7 G/DL (ref 12–16)
IMM GRANULOCYTES # BLD AUTO: 0.01 K/UL (ref 0–0.04)
IMM GRANULOCYTES NFR BLD AUTO: 0.2 % (ref 0–0.5)
LYMPHOCYTES # BLD AUTO: 1.8 K/UL (ref 1–4.8)
LYMPHOCYTES NFR BLD: 38.4 % (ref 18–48)
MCH RBC QN AUTO: 30.9 PG (ref 27–31)
MCHC RBC AUTO-ENTMCNC: 34.9 G/DL (ref 32–36)
MCV RBC AUTO: 89 FL (ref 82–98)
MONOCYTES # BLD AUTO: 0.7 K/UL (ref 0.3–1)
MONOCYTES NFR BLD: 13.7 % (ref 4–15)
NEUTROPHILS # BLD AUTO: 2.1 K/UL (ref 1.8–7.7)
NEUTROPHILS NFR BLD: 45 % (ref 38–73)
NRBC BLD-RTO: 0 /100 WBC
PLATELET # BLD AUTO: 244 K/UL (ref 150–450)
PMV BLD AUTO: 12.7 FL (ref 9.2–12.9)
POTASSIUM SERPL-SCNC: 3.6 MMOL/L (ref 3.5–5.1)
PROT SERPL-MCNC: 7.4 G/DL (ref 6–8.4)
RBC # BLD AUTO: 4.43 M/UL (ref 4–5.4)
SODIUM SERPL-SCNC: 138 MMOL/L (ref 136–145)
WBC # BLD AUTO: 4.76 K/UL (ref 3.9–12.7)

## 2022-08-12 PROCEDURE — 80053 COMPREHEN METABOLIC PANEL: CPT | Performed by: PHYSICIAN ASSISTANT

## 2022-08-12 PROCEDURE — 36415 COLL VENOUS BLD VENIPUNCTURE: CPT | Mod: PO | Performed by: PHYSICIAN ASSISTANT

## 2022-08-12 PROCEDURE — 85651 RBC SED RATE NONAUTOMATED: CPT | Performed by: PHYSICIAN ASSISTANT

## 2022-08-12 PROCEDURE — 86140 C-REACTIVE PROTEIN: CPT | Performed by: PHYSICIAN ASSISTANT

## 2022-08-12 PROCEDURE — 85025 COMPLETE CBC W/AUTO DIFF WBC: CPT | Performed by: PHYSICIAN ASSISTANT

## 2022-08-17 ENCOUNTER — OFFICE VISIT (OUTPATIENT)
Dept: PULMONOLOGY | Facility: CLINIC | Age: 47
End: 2022-08-17
Payer: COMMERCIAL

## 2022-08-17 VITALS
RESPIRATION RATE: 18 BRPM | BODY MASS INDEX: 37.28 KG/M2 | WEIGHT: 266.31 LBS | SYSTOLIC BLOOD PRESSURE: 140 MMHG | OXYGEN SATURATION: 96 % | DIASTOLIC BLOOD PRESSURE: 90 MMHG | HEIGHT: 71 IN | HEART RATE: 76 BPM

## 2022-08-17 DIAGNOSIS — R06.02 SOB (SHORTNESS OF BREATH): Primary | ICD-10-CM

## 2022-08-17 DIAGNOSIS — D86.0 PULMONARY SARCOIDOSIS: ICD-10-CM

## 2022-08-17 PROCEDURE — 1160F PR REVIEW ALL MEDS BY PRESCRIBER/CLIN PHARMACIST DOCUMENTED: ICD-10-PCS | Mod: CPTII,S$GLB,, | Performed by: INTERNAL MEDICINE

## 2022-08-17 PROCEDURE — 99999 PR PBB SHADOW E&M-EST. PATIENT-LVL IV: ICD-10-PCS | Mod: PBBFAC,,, | Performed by: INTERNAL MEDICINE

## 2022-08-17 PROCEDURE — 99999 PR PBB SHADOW E&M-EST. PATIENT-LVL IV: CPT | Mod: PBBFAC,,, | Performed by: INTERNAL MEDICINE

## 2022-08-17 PROCEDURE — 1159F MED LIST DOCD IN RCRD: CPT | Mod: CPTII,S$GLB,, | Performed by: INTERNAL MEDICINE

## 2022-08-17 PROCEDURE — 3008F BODY MASS INDEX DOCD: CPT | Mod: CPTII,S$GLB,, | Performed by: INTERNAL MEDICINE

## 2022-08-17 PROCEDURE — 3080F PR MOST RECENT DIASTOLIC BLOOD PRESSURE >= 90 MM HG: ICD-10-PCS | Mod: CPTII,S$GLB,, | Performed by: INTERNAL MEDICINE

## 2022-08-17 PROCEDURE — 99214 OFFICE O/P EST MOD 30 MIN: CPT | Mod: S$GLB,,, | Performed by: INTERNAL MEDICINE

## 2022-08-17 PROCEDURE — 1159F PR MEDICATION LIST DOCUMENTED IN MEDICAL RECORD: ICD-10-PCS | Mod: CPTII,S$GLB,, | Performed by: INTERNAL MEDICINE

## 2022-08-17 PROCEDURE — 3008F PR BODY MASS INDEX (BMI) DOCUMENTED: ICD-10-PCS | Mod: CPTII,S$GLB,, | Performed by: INTERNAL MEDICINE

## 2022-08-17 PROCEDURE — 1160F RVW MEDS BY RX/DR IN RCRD: CPT | Mod: CPTII,S$GLB,, | Performed by: INTERNAL MEDICINE

## 2022-08-17 PROCEDURE — 3080F DIAST BP >= 90 MM HG: CPT | Mod: CPTII,S$GLB,, | Performed by: INTERNAL MEDICINE

## 2022-08-17 PROCEDURE — 3077F SYST BP >= 140 MM HG: CPT | Mod: CPTII,S$GLB,, | Performed by: INTERNAL MEDICINE

## 2022-08-17 PROCEDURE — 3077F PR MOST RECENT SYSTOLIC BLOOD PRESSURE >= 140 MM HG: ICD-10-PCS | Mod: CPTII,S$GLB,, | Performed by: INTERNAL MEDICINE

## 2022-08-17 PROCEDURE — 99214 PR OFFICE/OUTPT VISIT, EST, LEVL IV, 30-39 MIN: ICD-10-PCS | Mod: S$GLB,,, | Performed by: INTERNAL MEDICINE

## 2022-08-17 RX ORDER — ALBUTEROL SULFATE 90 UG/1
2 AEROSOL, METERED RESPIRATORY (INHALATION) EVERY 6 HOURS PRN
Qty: 18 G | Refills: 11 | Status: SHIPPED | OUTPATIENT
Start: 2022-08-17 | End: 2023-08-17

## 2022-08-17 NOTE — PROGRESS NOTES
Subjective:     Patient ID: Henrietta Parada is a 46 y.o. female.    Chief Complaint:      HPI  history of sarcoid since 2009  No skin problems with sarcoidosis  COVID in 2020.    Sarcoidosis FolloHer lower extremity subcutaneous nodules have resolved 2 weeks after starting her on prednisone.  She was initially started on 40 mg daily but complained of tremor I decreased the dosage  to 30 mg daily.     She is following with Dr. GILLIAM in [facility], she has a cardiac MRI to rule out cardiac sarcoidosis scheduled November 2018.     About a month prior to her initial presentation to see me, the patient started noticing shortness of breath, no cough no wheezing, she also noticed again subcutaneous nodules in bilateral lower extremities.     She has had a bronchoscopy and also mediastinal lymph node biopsy apparently because she has a scar at the lower neck in 2009 and diagnosed with sarcoidosis.  She had in the past abnormal chest x-ray, shortness of breath , cough and subcutaneous nodules mainly in her lower extremities and she was treated with prednisone for about 4 or 5 years and as of 2014 she stopped prednisone and she was doing well.w up:      Dyspnea  Patient complains of shortness of breath. Symptoms occur after more than one flight stairs, with more than one block walking. Symptoms began 2 years ago, gradually improving since. Associated symptoms include  dyspnea on exertion and shortness of breath. He denies chest pain, located left chest. He does not have had recent travel. Weight has increased 30 pounds over last 2 - 3 years. Symptoms are exacerbated by strenuous activity. Symptoms are alleviated by rest.     History of sarcoidosis. No iritis, eye irritation, no new skin lesions, no arthralgias.  Sarcoidosis stage:   []        Stage I Sarcoidosis  [x]        Stage II Sarcoidosis  []        Stage III Sarcoidosis  []        Stage IV Sarcoidosis    Clinical assessment:   []        Symptomatic  [x]         Assymptomatic       Indications for treatment of pulmonary sarcoidosis:   []        Evidence of progressive disease   []        Severe disease at presentation    Management options:   [x]        Observation without therapy  []        Systemic glucocorticoids  []        Methotrexate.  []        Azathioprine   []        Leuflonamide  []        Mycophenolate  []        Anti TNF alpha antagonists       Past Medical History:   Diagnosis Date    Arthritis     Cutaneous sarcoidosis     Hypertension 6/19/2013    Pulmonary sarcoidosis     Sarcoidosis     Sarcoidosis of lung      Past Surgical History:   Procedure Laterality Date    mediastinoscopy       Review of patient's allergies indicates:   Allergen Reactions    Sulfa (sulfonamide antibiotics) Itching and Swelling     Current Outpatient Medications on File Prior to Visit   Medication Sig Dispense Refill    acetaminophen-codeine 300-30mg (TYLENOL #3) 300-30 mg Tab Take 0.5-1 tablets by mouth 2 (two) times daily as needed.      amLODIPine (NORVASC) 10 MG tablet Take 10 mg by mouth once daily.      aspirin (ECOTRIN) 81 MG EC tablet Take 81 mg by mouth once daily.      atorvastatin (LIPITOR) 20 MG tablet Take 20 mg by mouth once daily.      carvediloL (COREG) 12.5 MG tablet Take 1 tablet by mouth 2 (two) times a day.      citalopram (CELEXA) 20 MG tablet Take 20 mg by mouth once daily.      cyclobenzaprine (FLEXERIL) 10 MG tablet Take 10 mg by mouth every 8 (eight) hours as needed.      furosemide (LASIX) 20 MG tablet Take 20 mg by mouth every other day.      gabapentin (NEURONTIN) 300 MG capsule Take 300 mg by mouth 3 (three) times daily.      losartan-hydrochlorothiazide 100-25 mg (HYZAAR) 100-25 mg per tablet Take 1 tablet by mouth once daily.      meclizine (ANTIVERT) 25 mg tablet Take 25 mg by mouth 3 (three) times daily as needed.      melatonin 1 mg Tab Take by mouth.       meloxicam (MOBIC) 15 MG tablet Take 15 mg by mouth once daily.  3     "metaxalone (SKELAXIN) 800 MG tablet Take 800 mg by mouth 3 (three) times daily.      methocarbamoL (ROBAXIN) 750 MG Tab TAKE ONE TABLET BY MOUTH EVERY 8 HOURS FOR MUSCLE SPASM      pantoprazole (PROTONIX) 40 MG tablet Take 40 mg by mouth once daily.      spironolactone (ALDACTONE) 25 MG tablet Take 25 mg by mouth once daily.      TARINA FE 1-20 EQ, 28, 1 mg-20 mcg (21)/75 mg (7) per tablet Take 1 tablet by mouth once daily.       No current facility-administered medications on file prior to visit.     Social History     Socioeconomic History    Marital status:    Tobacco Use    Smoking status: Never Smoker    Smokeless tobacco: Never Used   Substance and Sexual Activity    Alcohol use: Yes     Comment: occasionally    Drug use: No    Sexual activity: Never     Birth control/protection: None     Family History   Problem Relation Age of Onset    Sarcoidosis Brother        Review of Systems   Constitutional: Negative for fever and fatigue.   HENT: Negative for postnasal drip and rhinorrhea.    Eyes: Negative for redness and itching.   Respiratory: Negative for cough, shortness of breath, wheezing, dyspnea on extertion and Paroxysmal Nocturnal Dyspnea.    Cardiovascular: Negative for chest pain.   Genitourinary: Negative for difficulty urinating and hematuria.   Endocrine: Negative for polyphagia, cold intolerance and heat intolerance.    Musculoskeletal: Positive for arthralgias and back pain.   Skin: Negative for rash.   Gastrointestinal: Negative for nausea, vomiting, abdominal pain and abdominal distention.   Neurological: Negative for dizziness and headaches.   Hematological: Negative for adenopathy. Does not bruise/bleed easily and no excessive bruising.   Psychiatric/Behavioral: The patient is not nervous/anxious.        Objective:      BP (!) 140/90   Pulse 76   Resp 18   Ht 5' 11" (1.803 m)   Wt 120.8 kg (266 lb 5.1 oz)   SpO2 96%   BMI 37.14 kg/m²   Physical Exam  Vitals and nursing " note reviewed.   Constitutional:       Appearance: Normal appearance. She is well-developed.   HENT:      Head: Normocephalic and atraumatic.      Nose: Nose normal.   Eyes:      Conjunctiva/sclera: Conjunctivae normal.      Pupils: Pupils are equal, round, and reactive to light.   Neck:      Thyroid: No thyromegaly.      Vascular: No JVD.      Trachea: No tracheal deviation.   Cardiovascular:      Rate and Rhythm: Normal rate and regular rhythm.      Heart sounds: Normal heart sounds.   Pulmonary:      Effort: Pulmonary effort is normal. No respiratory distress.      Breath sounds: Normal breath sounds. No wheezing or rales.   Chest:      Chest wall: No tenderness.   Abdominal:      General: Bowel sounds are normal.      Palpations: Abdomen is soft.   Musculoskeletal:         General: Normal range of motion.      Cervical back: Neck supple.   Lymphadenopathy:      Cervical: No cervical adenopathy.   Skin:     General: Skin is warm and dry.   Neurological:      Mental Status: She is alert and oriented to person, place, and time.       Personal Diagnostic Review  Chest x-ray: stable , WNL       Pulmonary Studies Review 8/17/2022   SpO2 96   Ordering Provider -   Performing nurse/tech/RT -   Diagnosis -   Height 71   Weight 4261.05   BMI (Calculated) 37.2   Predicted Distance 371.86   Patient Race -   6MWT Status -   Patient Reported -   Was O2 used? -   6MW Distance walked (feet) -   Distance walked (meters) -   Did patient stop? -   Oxygen Saturation -   Supplemental Oxygen -   Heart Rate -   Blood Pressure -   Yayo Dyspnea Rating  -   Oxygen Saturation -   Supplemental Oxygen -   Heart Rate -   Blood Pressure -   Yayo Dyspnea Rating  -   Is procedure ready for interpretation? -   Predicted Distance Meters (Calculated) 499.14   Oxygen Qualification? -       X-Ray Chest PA And Lateral  Narrative: EXAMINATION:  XR CHEST PA AND LATERAL    CLINICAL HISTORY:  Sarcoidosis, unspecified    TECHNIQUE:  PA and lateral views  of the chest were performed.    COMPARISON:  Prior radiographs    FINDINGS:  Cardiac silhouette and mediastinal contours are normal.  Lungs are clear.  Osseous structures are intact.  Impression: No acute cardiopulmonary process.    Electronically signed by: Trent Parsons MD  Date:    07/11/2022  Time:    14:24      Office Spirometry Results:     No flowsheet data found.  Pulmonary Studies Review 8/17/2022   SpO2 96   Ordering Provider -   Performing nurse/tech/RT -   Diagnosis -   Height 71   Weight 4261.05   BMI (Calculated) 37.2   Predicted Distance 371.86   Patient Race -   6MWT Status -   Patient Reported -   Was O2 used? -   6MW Distance walked (feet) -   Distance walked (meters) -   Did patient stop? -   Oxygen Saturation -   Supplemental Oxygen -   Heart Rate -   Blood Pressure -   Yayo Dyspnea Rating  -   Oxygen Saturation -   Supplemental Oxygen -   Heart Rate -   Blood Pressure -   Yayo Dyspnea Rating  -   Is procedure ready for interpretation? -   Predicted Distance Meters (Calculated) 499.14   Oxygen Qualification? -         Assessment:            SOB (shortness of breath)  -     albuterol (VENTOLIN HFA) 90 mcg/actuation inhaler; Inhale 2 puffs into the lungs every 6 (six) hours as needed for Shortness of Breath (before exercise). Rescue  Dispense: 18 g; Refill: 11    Pulmonary sarcoidosis  -     Ambulatory referral/consult to Pulmonology  -     X-Ray Chest PA And Lateral; Future; Expected date: 08/17/2022  -     Complete PFT with bronchodilator; Future; Expected date: 08/17/2022  -     albuterol (VENTOLIN HFA) 90 mcg/actuation inhaler; Inhale 2 puffs into the lungs every 6 (six) hours as needed for Shortness of Breath (before exercise). Rescue  Dispense: 18 g; Refill: 11          Outpatient Encounter Medications as of 8/17/2022   Medication Sig Dispense Refill    acetaminophen-codeine 300-30mg (TYLENOL #3) 300-30 mg Tab Take 0.5-1 tablets by mouth 2 (two) times daily as needed.      amLODIPine  (NORVASC) 10 MG tablet Take 10 mg by mouth once daily.      aspirin (ECOTRIN) 81 MG EC tablet Take 81 mg by mouth once daily.      atorvastatin (LIPITOR) 20 MG tablet Take 20 mg by mouth once daily.      carvediloL (COREG) 12.5 MG tablet Take 1 tablet by mouth 2 (two) times a day.      citalopram (CELEXA) 20 MG tablet Take 20 mg by mouth once daily.      cyclobenzaprine (FLEXERIL) 10 MG tablet Take 10 mg by mouth every 8 (eight) hours as needed.      furosemide (LASIX) 20 MG tablet Take 20 mg by mouth every other day.      gabapentin (NEURONTIN) 300 MG capsule Take 300 mg by mouth 3 (three) times daily.      losartan-hydrochlorothiazide 100-25 mg (HYZAAR) 100-25 mg per tablet Take 1 tablet by mouth once daily.      meclizine (ANTIVERT) 25 mg tablet Take 25 mg by mouth 3 (three) times daily as needed.      melatonin 1 mg Tab Take by mouth.       meloxicam (MOBIC) 15 MG tablet Take 15 mg by mouth once daily.  3    metaxalone (SKELAXIN) 800 MG tablet Take 800 mg by mouth 3 (three) times daily.      methocarbamoL (ROBAXIN) 750 MG Tab TAKE ONE TABLET BY MOUTH EVERY 8 HOURS FOR MUSCLE SPASM      pantoprazole (PROTONIX) 40 MG tablet Take 40 mg by mouth once daily.      spironolactone (ALDACTONE) 25 MG tablet Take 25 mg by mouth once daily.      TARINA FE 1-20 EQ, 28, 1 mg-20 mcg (21)/75 mg (7) per tablet Take 1 tablet by mouth once daily.      albuterol (VENTOLIN HFA) 90 mcg/actuation inhaler Inhale 2 puffs into the lungs every 6 (six) hours as needed for Shortness of Breath (before exercise). Rescue 18 g 11     No facility-administered encounter medications on file as of 8/17/2022.     Plan:       Requested Prescriptions     Signed Prescriptions Disp Refills    albuterol (VENTOLIN HFA) 90 mcg/actuation inhaler 18 g 11     Sig: Inhale 2 puffs into the lungs every 6 (six) hours as needed for Shortness of Breath (before exercise). Rescue     Problem List Items Addressed This Visit     Pulmonary sarcoidosis     Relevant Medications    albuterol (VENTOLIN HFA) 90 mcg/actuation inhaler    Other Relevant Orders    X-Ray Chest PA And Lateral    Complete PFT with bronchodilator      Other Visit Diagnoses     SOB (shortness of breath)    -  Primary    Relevant Medications    albuterol (VENTOLIN HFA) 90 mcg/actuation inhaler             Follow up in about 1 year (around 8/17/2023) for Review PFT and CXR - on return.    MEDICAL DECISION MAKING: Moderate to high complexity.  Overall, the multiple problems listed are of moderate to high severity that may impact quality of life and activities of daily living. Side effects of medications, treatment plan as well as options and alternatives reviewed and discussed with patient. There was counseling of patient concerning these issues.    Total time spent in counseling and coordination of care - 30  minutes of total time spent on the encounter, which includes face to face time and non-face to face time preparing to see the patient (eg, review of tests), Obtaining and/or reviewing separately obtained history, Documenting clinical information in the electronic or other health record, Independently interpreting results (not separately reported) and communicating results to the patient/family/caregiver, or Care coordination (not separately reported).    Time was used in discussion of prognosis, risks, benefits of treatment, instructions and compliance with regimen . Discussion with other physicians and/or health care providers - home health or for use of durable medical equipment (oxygen, nebulizers, CPAP, BiPAP) occurred.

## 2022-09-01 ENCOUNTER — OFFICE VISIT (OUTPATIENT)
Dept: OPHTHALMOLOGY | Facility: CLINIC | Age: 47
End: 2022-09-01
Payer: COMMERCIAL

## 2022-09-01 DIAGNOSIS — H04.129 DRY EYE: ICD-10-CM

## 2022-09-01 DIAGNOSIS — H52.7 REFRACTIVE ERROR: ICD-10-CM

## 2022-09-01 DIAGNOSIS — D86.9 SARCOIDOSIS: Primary | ICD-10-CM

## 2022-09-01 DIAGNOSIS — R68.2 DRY MOUTH: ICD-10-CM

## 2022-09-01 PROCEDURE — 92015 PR REFRACTION: ICD-10-PCS | Mod: S$GLB,,, | Performed by: OPHTHALMOLOGY

## 2022-09-01 PROCEDURE — 99999 PR PBB SHADOW E&M-EST. PATIENT-LVL III: ICD-10-PCS | Mod: PBBFAC,,, | Performed by: OPHTHALMOLOGY

## 2022-09-01 PROCEDURE — 1160F PR REVIEW ALL MEDS BY PRESCRIBER/CLIN PHARMACIST DOCUMENTED: ICD-10-PCS | Mod: CPTII,S$GLB,, | Performed by: OPHTHALMOLOGY

## 2022-09-01 PROCEDURE — 99999 PR PBB SHADOW E&M-EST. PATIENT-LVL III: CPT | Mod: PBBFAC,,, | Performed by: OPHTHALMOLOGY

## 2022-09-01 PROCEDURE — 1159F MED LIST DOCD IN RCRD: CPT | Mod: CPTII,S$GLB,, | Performed by: OPHTHALMOLOGY

## 2022-09-01 PROCEDURE — 1159F PR MEDICATION LIST DOCUMENTED IN MEDICAL RECORD: ICD-10-PCS | Mod: CPTII,S$GLB,, | Performed by: OPHTHALMOLOGY

## 2022-09-01 PROCEDURE — 1160F RVW MEDS BY RX/DR IN RCRD: CPT | Mod: CPTII,S$GLB,, | Performed by: OPHTHALMOLOGY

## 2022-09-01 PROCEDURE — 99213 OFFICE O/P EST LOW 20 MIN: CPT | Mod: S$GLB,,, | Performed by: OPHTHALMOLOGY

## 2022-09-01 PROCEDURE — 92015 DETERMINE REFRACTIVE STATE: CPT | Mod: S$GLB,,, | Performed by: OPHTHALMOLOGY

## 2022-09-01 PROCEDURE — 99213 PR OFFICE/OUTPT VISIT, EST, LEVL III, 20-29 MIN: ICD-10-PCS | Mod: S$GLB,,, | Performed by: OPHTHALMOLOGY

## 2022-09-01 NOTE — PROGRESS NOTES
SUBJECTIVE  Henrietta Parada is 46 y.o. female  Corrected distance visual acuity was 20/25 in the right eye and 20/25 in the left eye.   Chief Complaint   Patient presents with    Annual Exam     Pt here for annual exam. No pain or discomfort. Pt states she has had trouble seeing out of her glasses. She says her vision has always been blurry through them.           HPI     Annual Exam     Additional comments: Pt here for annual exam. No pain or discomfort. Pt   states she has had trouble seeing out of her glasses. She says her vision   has always been blurry through them.            Comments    1. Sarcoidosis (No Ocular Involvement) since [id number]   2. Mild Dry eyes / Dry Mouth     Cellcept   Prednisone 20mg daily since late September   Was previously on 40mg   (Pt stopped Prednisone earlier this year.) 8/09/21.          Last edited by Yonatan Domingo MA on 9/1/2022  2:19 PM.         Assessment /Plan :  1. Sarcoidosis - no ocular involvement   2. Dry eye  -- Condition stable, no therapeutic change required. Monitoring routinely.     3. Dry mouth    4. Refractive error PAL Rx     RTC in 1 year or prn any changes

## 2022-11-29 ENCOUNTER — OFFICE VISIT (OUTPATIENT)
Dept: RHEUMATOLOGY | Facility: CLINIC | Age: 47
End: 2022-11-29
Payer: COMMERCIAL

## 2022-11-29 VITALS
WEIGHT: 270.31 LBS | BODY MASS INDEX: 37.84 KG/M2 | DIASTOLIC BLOOD PRESSURE: 87 MMHG | HEART RATE: 82 BPM | SYSTOLIC BLOOD PRESSURE: 124 MMHG | HEIGHT: 71 IN

## 2022-11-29 DIAGNOSIS — M54.42 CHRONIC BILATERAL LOW BACK PAIN WITH BILATERAL SCIATICA: ICD-10-CM

## 2022-11-29 DIAGNOSIS — M54.41 CHRONIC BILATERAL LOW BACK PAIN WITH BILATERAL SCIATICA: ICD-10-CM

## 2022-11-29 DIAGNOSIS — M25.50 POLYARTHRALGIA: Primary | ICD-10-CM

## 2022-11-29 DIAGNOSIS — D86.9 SARCOIDOSIS: ICD-10-CM

## 2022-11-29 DIAGNOSIS — G89.29 CHRONIC BILATERAL LOW BACK PAIN WITH BILATERAL SCIATICA: ICD-10-CM

## 2022-11-29 DIAGNOSIS — D86.0 PULMONARY SARCOIDOSIS: ICD-10-CM

## 2022-11-29 PROCEDURE — 99215 OFFICE O/P EST HI 40 MIN: CPT | Mod: S$GLB,,, | Performed by: STUDENT IN AN ORGANIZED HEALTH CARE EDUCATION/TRAINING PROGRAM

## 2022-11-29 PROCEDURE — 3008F BODY MASS INDEX DOCD: CPT | Mod: CPTII,S$GLB,, | Performed by: STUDENT IN AN ORGANIZED HEALTH CARE EDUCATION/TRAINING PROGRAM

## 2022-11-29 PROCEDURE — 3074F SYST BP LT 130 MM HG: CPT | Mod: CPTII,S$GLB,, | Performed by: STUDENT IN AN ORGANIZED HEALTH CARE EDUCATION/TRAINING PROGRAM

## 2022-11-29 PROCEDURE — 1159F PR MEDICATION LIST DOCUMENTED IN MEDICAL RECORD: ICD-10-PCS | Mod: CPTII,S$GLB,, | Performed by: STUDENT IN AN ORGANIZED HEALTH CARE EDUCATION/TRAINING PROGRAM

## 2022-11-29 PROCEDURE — 3074F PR MOST RECENT SYSTOLIC BLOOD PRESSURE < 130 MM HG: ICD-10-PCS | Mod: CPTII,S$GLB,, | Performed by: STUDENT IN AN ORGANIZED HEALTH CARE EDUCATION/TRAINING PROGRAM

## 2022-11-29 PROCEDURE — 3008F PR BODY MASS INDEX (BMI) DOCUMENTED: ICD-10-PCS | Mod: CPTII,S$GLB,, | Performed by: STUDENT IN AN ORGANIZED HEALTH CARE EDUCATION/TRAINING PROGRAM

## 2022-11-29 PROCEDURE — 1160F PR REVIEW ALL MEDS BY PRESCRIBER/CLIN PHARMACIST DOCUMENTED: ICD-10-PCS | Mod: CPTII,S$GLB,, | Performed by: STUDENT IN AN ORGANIZED HEALTH CARE EDUCATION/TRAINING PROGRAM

## 2022-11-29 PROCEDURE — 3079F DIAST BP 80-89 MM HG: CPT | Mod: CPTII,S$GLB,, | Performed by: STUDENT IN AN ORGANIZED HEALTH CARE EDUCATION/TRAINING PROGRAM

## 2022-11-29 PROCEDURE — 3079F PR MOST RECENT DIASTOLIC BLOOD PRESSURE 80-89 MM HG: ICD-10-PCS | Mod: CPTII,S$GLB,, | Performed by: STUDENT IN AN ORGANIZED HEALTH CARE EDUCATION/TRAINING PROGRAM

## 2022-11-29 PROCEDURE — 99999 PR PBB SHADOW E&M-EST. PATIENT-LVL V: CPT | Mod: PBBFAC,,, | Performed by: STUDENT IN AN ORGANIZED HEALTH CARE EDUCATION/TRAINING PROGRAM

## 2022-11-29 PROCEDURE — 99999 PR PBB SHADOW E&M-EST. PATIENT-LVL V: ICD-10-PCS | Mod: PBBFAC,,, | Performed by: STUDENT IN AN ORGANIZED HEALTH CARE EDUCATION/TRAINING PROGRAM

## 2022-11-29 PROCEDURE — 1160F RVW MEDS BY RX/DR IN RCRD: CPT | Mod: CPTII,S$GLB,, | Performed by: STUDENT IN AN ORGANIZED HEALTH CARE EDUCATION/TRAINING PROGRAM

## 2022-11-29 PROCEDURE — 99215 PR OFFICE/OUTPT VISIT, EST, LEVL V, 40-54 MIN: ICD-10-PCS | Mod: S$GLB,,, | Performed by: STUDENT IN AN ORGANIZED HEALTH CARE EDUCATION/TRAINING PROGRAM

## 2022-11-29 PROCEDURE — 1159F MED LIST DOCD IN RCRD: CPT | Mod: CPTII,S$GLB,, | Performed by: STUDENT IN AN ORGANIZED HEALTH CARE EDUCATION/TRAINING PROGRAM

## 2022-11-29 RX ORDER — NABUMETONE 750 MG/1
750 TABLET, FILM COATED ORAL 2 TIMES DAILY
Qty: 60 TABLET | Refills: 3 | Status: SHIPPED | OUTPATIENT
Start: 2022-11-29

## 2022-11-29 RX ORDER — CHLORHEXIDINE GLUCONATE ORAL RINSE 1.2 MG/ML
15 SOLUTION DENTAL 2 TIMES DAILY
COMMUNITY
Start: 2022-11-15

## 2022-11-29 RX ORDER — DICLOFENAC SODIUM 75 MG/1
75 TABLET, DELAYED RELEASE ORAL 2 TIMES DAILY
COMMUNITY
Start: 2022-09-13 | End: 2022-11-29 | Stop reason: ALTCHOICE

## 2022-11-30 ENCOUNTER — TELEPHONE (OUTPATIENT)
Dept: RHEUMATOLOGY | Facility: CLINIC | Age: 47
End: 2022-11-30
Payer: COMMERCIAL

## 2022-11-30 ENCOUNTER — TELEPHONE (OUTPATIENT)
Dept: PAIN MEDICINE | Facility: CLINIC | Age: 47
End: 2022-11-30
Payer: COMMERCIAL

## 2022-11-30 NOTE — PROGRESS NOTES
RHEUMATOLOGY CLINIC established patient VISIT    Reason for consult:-pulmonary sarcoidosis     Chief complaints, HPI, ROS, EXAM, Assessment & Plans:-    Henrietta Parada is a 47 y.o. pleasant female who presents to follow-up for pulmonary sarcoidosis.  She was last seen by Stacy Ba in July.  She was previously on prednisone and CellCept but has been off of these for some time and been doing well from pulmonary standpoint.  She denies significant shortness of breath or cough.  No cutaneous involvement at this time.  She states that she is been having a lot of pain in her feet in her legs that is worse with activity.  [facility]  She has had injections with them.  She takes gabapentin.  She has tried taking Voltaren orally and meloxicam in the past but did not provide very much relief.The patient denies fevers, patchy alopecia, oral/nasal ulcers, sicca symptoms, rashes, photosensitivity, joint swelling, pleuritic chest pain, shortness of breath, cough, abdominal pain, diarrhea, bloody stool, weakness, numbness/tingling, and Raynaud's.No evidence of synovitis, dactylitis or enthesitis.       Reviewed all available old and outside pertinent medical records.    All lab results personally reviewed and interpreted by me.    1. Polyarthralgia    2. Sarcoidosis    3. Pulmonary sarcoidosis    4. Chronic bilateral low back pain with bilateral sciatica        Problem List Items Addressed This Visit          Pulmonary    Pulmonary sarcoidosis    Relevant Medications    nabumetone (RELAFEN) 750 MG tablet    Other Relevant Orders    CBC Auto Differential    Comprehensive Metabolic Panel    Vitamin D    Calcitriol    Sedimentation rate    C-Reactive Protein     Other Visit Diagnoses       Polyarthralgia    -  Primary    Relevant Medications    nabumetone (RELAFEN) 750 MG tablet    Other Relevant Orders    CBC Auto Differential    Comprehensive Metabolic Panel    Vitamin D    Calcitriol     Sedimentation rate    C-Reactive Protein    Sarcoidosis        Relevant Medications    nabumetone (RELAFEN) 750 MG tablet    Other Relevant Orders    CBC Auto Differential    Comprehensive Metabolic Panel    Vitamin D    Calcitriol    Sedimentation rate    C-Reactive Protein    Chronic bilateral low back pain with bilateral sciatica        Relevant Orders    Ambulatory referral/consult to Pain Clinic            Patient following up for pulmonary sarcoidosis  She is doing well from this standpoint without significant shortness of breath or cough  She was previously on CellCept and prednisone but has been off of this for some time  Patient had outside labs which she will provide  We will check her vitamin-D and calcitriol levels  Will refer to our pain clinic refer to our pain clinic for her low back pain  Discontinue oral Voltaren and meloxicam and trial nabumetone    # No follow-ups on file.    Chronic comorbid conditions affecting medical decision making today:    Past Medical History:   Diagnosis Date    Arthritis     Cutaneous sarcoidosis     Hypertension 6/19/2013    Pulmonary sarcoidosis     Sarcoidosis     Sarcoidosis of lung        Past Surgical History:   Procedure Laterality Date    mediastinoscopy          Social History     Tobacco Use    Smoking status: Never    Smokeless tobacco: Never   Substance Use Topics    Alcohol use: Yes     Comment: occasionally    Drug use: No       Family History   Problem Relation Age of Onset    Sarcoidosis Brother        Review of patient's allergies indicates:   Allergen Reactions    Sulfa (sulfonamide antibiotics) Itching and Swelling       Medication List with Changes/Refills   New Medications    NABUMETONE (RELAFEN) 750 MG TABLET    Take 1 tablet (750 mg total) by mouth 2 (two) times daily.   Current Medications    ACETAMINOPHEN-CODEINE 300-30MG (TYLENOL #3) 300-30 MG TAB    Take 0.5-1 tablets by mouth 2 (two) times daily as needed.    ALBUTEROL (VENTOLIN HFA) 90  MCG/ACTUATION INHALER    Inhale 2 puffs into the lungs every 6 (six) hours as needed for Shortness of Breath (before exercise). Rescue    AMLODIPINE (NORVASC) 10 MG TABLET    Take 10 mg by mouth once daily.    ASPIRIN (ECOTRIN) 81 MG EC TABLET    Take 81 mg by mouth once daily.    ATORVASTATIN (LIPITOR) 20 MG TABLET    Take 20 mg by mouth once daily.    CARVEDILOL (COREG) 12.5 MG TABLET    Take 1 tablet by mouth 2 (two) times a day.    CHLORHEXIDINE (PERIDEX) 0.12 % SOLUTION    15 mLs 2 (two) times daily.    CITALOPRAM (CELEXA) 20 MG TABLET    Take 20 mg by mouth once daily.    CYCLOBENZAPRINE (FLEXERIL) 10 MG TABLET    Take 10 mg by mouth every 8 (eight) hours as needed.    FUROSEMIDE (LASIX) 20 MG TABLET    Take 20 mg by mouth every other day.    GABAPENTIN (NEURONTIN) 300 MG CAPSULE    Take 300 mg by mouth 3 (three) times daily.    LOSARTAN-HYDROCHLOROTHIAZIDE 100-25 MG (HYZAAR) 100-25 MG PER TABLET    Take 1 tablet by mouth once daily.    MECLIZINE (ANTIVERT) 25 MG TABLET    Take 25 mg by mouth 3 (three) times daily as needed.    MELATONIN 1 MG TAB    Take by mouth.     METAXALONE (SKELAXIN) 800 MG TABLET    Take 800 mg by mouth 3 (three) times daily.    METHOCARBAMOL (ROBAXIN) 750 MG TAB    TAKE ONE TABLET BY MOUTH EVERY 8 HOURS FOR MUSCLE SPASM    PANTOPRAZOLE (PROTONIX) 40 MG TABLET    Take 40 mg by mouth once daily.    SPIRONOLACTONE (ALDACTONE) 25 MG TABLET    Take 25 mg by mouth once daily.    TARINA FE 1-20 EQ, 28, 1 MG-20 MCG (21)/75 MG (7) PER TABLET    Take 1 tablet by mouth once daily.   Discontinued Medications    DICLOFENAC (VOLTAREN) 75 MG EC TABLET    Take 75 mg by mouth 2 (two) times daily.    MELOXICAM (MOBIC) 15 MG TABLET    Take 15 mg by mouth once daily.         Disclaimer: This note was prepared using voice recognition system and is likely to have sound alike errors and is not proofread.  Please message me with any questions.    45 minutes of total time spent on the encounter, which includes  face to face time and non-face to face time preparing to see the patient (eg, review of tests), Obtaining and/or reviewing separately obtained history, Documenting clinical information in the electronic or other health record, Independently interpreting results (not separately reported) and communicating results to the patient/family/caregiver, or Care coordination (not separately reported).     Thank you for allowing me to participate in the care of Henrietta Parada.    Maulik Link MD

## 2022-11-30 NOTE — TELEPHONE ENCOUNTER
----- Message from Sonia Costa sent at 11/30/2022  8:32 AM CST -----  Contact: Henrietta  Pt is calling in regards to faxing the CRP, ESR and other labs that was done by another physcian.pt would like to know what is her next step since the labs is ok.Please call back [phone] or My Chart        Thanks  ANGY

## 2022-11-30 NOTE — TELEPHONE ENCOUNTER
"Maulik Link MD     [facility]  Nothing further.  Change to nabumetone as discussed, but no other new plans based on her labs which were unremarkable.      Contacted patient to discuss this with her. Patient verbalized understanding. All questions answered.     Zayra Ley (Allye), [facility]  Rheumatology Department    "

## 2022-11-30 NOTE — TELEPHONE ENCOUNTER
"Returned patients phone call. Patient stated that she saw Dr. Link yesterday and he wanted her to do lab work. Labs were completed and faxed this morning. She would like to know what the next step is. Advised patient that I will route this message to Dr. Link for further instruction. Patient verbalized understanding. All questions answered.       Zayra Ley (Allye), [facility]  Rheumatology Department    "

## 2023-01-11 ENCOUNTER — OFFICE VISIT (OUTPATIENT)
Dept: RHEUMATOLOGY | Facility: CLINIC | Age: 48
End: 2023-01-11
Payer: COMMERCIAL

## 2023-01-11 ENCOUNTER — PATIENT MESSAGE (OUTPATIENT)
Dept: RHEUMATOLOGY | Facility: CLINIC | Age: 48
End: 2023-01-11

## 2023-01-11 DIAGNOSIS — D86.0 PULMONARY SARCOIDOSIS: Primary | ICD-10-CM

## 2023-01-11 DIAGNOSIS — R05.2 SUBACUTE COUGH: ICD-10-CM

## 2023-01-11 DIAGNOSIS — D84.9 IMMUNOSUPPRESSED STATUS: ICD-10-CM

## 2023-01-11 PROCEDURE — 1160F PR REVIEW ALL MEDS BY PRESCRIBER/CLIN PHARMACIST DOCUMENTED: ICD-10-PCS | Mod: CPTII,95,, | Performed by: PHYSICIAN ASSISTANT

## 2023-01-11 PROCEDURE — 1159F MED LIST DOCD IN RCRD: CPT | Mod: CPTII,95,, | Performed by: PHYSICIAN ASSISTANT

## 2023-01-11 PROCEDURE — 99214 OFFICE O/P EST MOD 30 MIN: CPT | Mod: 95,,, | Performed by: PHYSICIAN ASSISTANT

## 2023-01-11 PROCEDURE — 1159F PR MEDICATION LIST DOCUMENTED IN MEDICAL RECORD: ICD-10-PCS | Mod: CPTII,95,, | Performed by: PHYSICIAN ASSISTANT

## 2023-01-11 PROCEDURE — 1160F RVW MEDS BY RX/DR IN RCRD: CPT | Mod: CPTII,95,, | Performed by: PHYSICIAN ASSISTANT

## 2023-01-11 PROCEDURE — 99214 PR OFFICE/OUTPT VISIT, EST, LEVL IV, 30-39 MIN: ICD-10-PCS | Mod: 95,,, | Performed by: PHYSICIAN ASSISTANT

## 2023-01-11 NOTE — Clinical Note
Reg 4, ace - external order - fax to pt at  [phone] Attn Henrietta Parada  F/u 6 mos w labs one week prior ([facility])

## 2023-01-11 NOTE — PROGRESS NOTES
The patient location is: work  The chief complaint leading to consultation is: Sarcoidosis    Visit type: audiovisual    Face to Face time with patient: 18 min  30 minutes of total time spent on the encounter, which includes face to face time and non-face to face time preparing to see the patient (eg, review of tests), Obtaining and/or reviewing separately obtained history, Documenting clinical information in the electronic or other health record, Independently interpreting results (not separately reported) and communicating results to the patient/family/caregiver, or Care coordination (not separately reported).         Each patient to whom he or she provides medical services by telemedicine is:  (1) informed of the relationship between the physician and patient and the respective role of any other health care provider with respect to management of the patient; and (2) notified that he or she may decline to receive medical services by telemedicine and may withdraw from such care at any time.    Notes:     Subjective:      Patient ID: Henrietta Parada is a 47 y.o. female.    Chief Complaint: No chief complaint on file.      HPI   Henrietta Parada  is a 47 y.o. female who is established in the department, but a new patient to my clinic.  She has history of sarcoidosis with pulmonary and cutaneous involvement.  She has no cutaneous flares.  She relates history of shortness of breath which is normal for her.  It is stable and not progressive.  Also relates that it initially got worse after COVID in 2020. Saw Dr. Earl back in Aug w planned one year f/u.  About 3 weeks ago she started with a cough.  She has chest x-ray done at [facility] which was reportedly normal.  Cough is improving.    Was previously treated with prednisone and then started CellCept as a steroid sparing agent.  They were able to stop taper off of the CellCept and she was considered to be in remission.  Labs under media tab 11/30/22 reviewed  today.  No ace level.  Remaining labs ok    Overall feels stable but complains of widespread pain.  She sees [facility].  She has had back and neck MRIs.  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  Recently had a visit with Dr. Link when she was having joint pain.  Those have improved.    Patient denies fevers, chills, photosensitivity, eye pain, shortness of breath, chest pain, hematuria, blood in the stool, rash, sicca symptoms, raynauds, finger ulcerations.  Rheumatologic systems otherwise negative.    Serologies/Labs:  Previously elevated ACE - most recently WNL  Current Treatment:  None   Previous Treatment:   Steroids  cellcept  betamethosone cream      Current Outpatient Medications:     acetaminophen-codeine 300-30mg (TYLENOL #3) 300-30 mg Tab, Take 0.5-1 tablets by mouth 2 (two) times daily as needed., Disp: , Rfl:     albuterol (VENTOLIN HFA) 90 mcg/actuation inhaler, Inhale 2 puffs into the lungs every 6 (six) hours as needed for Shortness of Breath (before exercise). Rescue, Disp: 18 g, Rfl: 11    amLODIPine (NORVASC) 10 MG tablet, Take 10 mg by mouth once daily., Disp: , Rfl:     aspirin (ECOTRIN) 81 MG EC tablet, Take 81 mg by mouth once daily., Disp: , Rfl:     atorvastatin (LIPITOR) 20 MG tablet, Take 20 mg by mouth once daily., Disp: , Rfl:     carvediloL (COREG) 12.5 MG tablet, Take 1 tablet by mouth 2 (two) times a day., Disp: , Rfl:     chlorhexidine (PERIDEX) 0.12 % solution, 15 mLs 2 (two) times daily., Disp: , Rfl:     citalopram (CELEXA) 20 MG tablet, Take 20 mg by mouth once daily., Disp: , Rfl:     cyclobenzaprine (FLEXERIL) 10 MG tablet, Take 10 mg by mouth every 8 (eight) hours as needed., Disp: , Rfl:     furosemide (LASIX) 20 MG tablet, Take 20 mg by mouth every other day., Disp: , Rfl:     gabapentin (NEURONTIN) 300 MG capsule, Take 300 mg by mouth 3 (three) times daily., Disp: , Rfl:      losartan-hydrochlorothiazide 100-25 mg (HYZAAR) 100-25 mg per tablet, Take 1 tablet by mouth once daily., Disp: , Rfl:     meclizine (ANTIVERT) 25 mg tablet, Take 25 mg by mouth 3 (three) times daily as needed., Disp: , Rfl:     melatonin 1 mg Tab, Take by mouth. , Disp: , Rfl:     metaxalone (SKELAXIN) 800 MG tablet, Take 800 mg by mouth 3 (three) times daily., Disp: , Rfl:     methocarbamoL (ROBAXIN) 750 MG Tab, TAKE ONE TABLET BY MOUTH EVERY 8 HOURS FOR MUSCLE SPASM, Disp: , Rfl:     nabumetone (RELAFEN) 750 MG tablet, Take 1 tablet (750 mg total) by mouth 2 (two) times daily., Disp: 60 tablet, Rfl: 3    pantoprazole (PROTONIX) 40 MG tablet, Take 40 mg by mouth once daily., Disp: , Rfl:     spironolactone (ALDACTONE) 25 MG tablet, Take 25 mg by mouth once daily., Disp: , Rfl:     TARINA FE 1-20 EQ, 28, 1 mg-20 mcg (21)/75 mg (7) per tablet, Take 1 tablet by mouth once daily., Disp: , Rfl:     Past Medical History:   Diagnosis Date    Arthritis     Cutaneous sarcoidosis     Hypertension 6/19/2013    Pulmonary sarcoidosis     Sarcoidosis     Sarcoidosis of lung      Family History   Problem Relation Age of Onset    Sarcoidosis Brother      Social History     Socioeconomic History    Marital status:    Tobacco Use    Smoking status: Never    Smokeless tobacco: Never   Substance and Sexual Activity    Alcohol use: Yes     Comment: occasionally    Drug use: No    Sexual activity: Never     Birth control/protection: None     Review of patient's allergies indicates:   Allergen Reactions    Sulfa (sulfonamide antibiotics) Itching and Swelling       Objective:   There were no vitals taken for this visit.  Immunization History   Administered Date(s) Administered    COVID-19, MRNA, LN-S, PF (Pfizer) (Purple Cap) 07/27/2021, 08/24/2021    DTP 03/12/1975, 08/06/1975, 12/03/1975    Influenza 10/01/2018    Influenza - Quadrivalent - PF *Preferred* (6 months and older) 10/27/2021    Influenza A (H1N1) 2009 Monovalent - IM  - PF 11/06/2009    MMR 08/22/1979    Measles / Rubella 10/01/1975    OPV 03/12/1975, 08/06/1975, 12/03/1975, 08/22/1979    Pneumococcal Conjugate - 13 Valent 11/12/2018    Pneumococcal Polysaccharide - 23 Valent 07/22/2020    Td (ADULT) 08/22/1979, 10/12/1999       Physical Exam   Constitutional: She is oriented to person, place, and time. No distress.   HENT:   Head: Normocephalic and atraumatic.   Pulmonary/Chest: Effort normal.   Musculoskeletal:      Cervical back: Normal range of motion.   Neurological: She is alert and oriented to person, place, and time.   Psychiatric: Mood normal. No synovitis, no enthesitis, no dactylitis  No acute distress    No results found for this or any previous visit (from the past 672 hour(s)).  I have personally reviewed labs from [facility] as above from November 2022      Lab Results   Component Value Date    TBGOLDPLUS Negative 10/03/2018      Lab Results   Component Value Date    HEPAIGM Negative 10/03/2018    HEPBIGM Negative 10/03/2018    HEPBCAB Negative 12/20/2018    HEPCAB Negative 10/03/2018      I have personally reviewed chest x-ray from today and agree with the radiologist interpretation  X-Ray Chest PA And Lateral  Narrative: EXAMINATION:  XR CHEST PA AND LATERAL    CLINICAL HISTORY:  Sarcoidosis, unspecified    TECHNIQUE:  PA and lateral views of the chest were performed.    COMPARISON:  Prior radiographs    FINDINGS:  Cardiac silhouette and mediastinal contours are normal.  Lungs are clear.  Osseous structures are intact.  Impression: No acute cardiopulmonary process.    Electronically signed by: Trent Parsons MD  Date:    07/11/2022  Time:    14:24        Assessment:     1. Pulmonary sarcoidosis    2. Subacute cough    3. Immunosuppressed status              Plan:     Diagnoses and all orders for this visit:    Pulmonary sarcoidosis  -     X-Ray Chest PA And Lateral; Future  -     CBC Auto Differential; Standing  -     Comprehensive Metabolic Panel;  Standing  -     Sedimentation rate; Standing  -     C-Reactive Protein; Standing  -     Angiotensin Converting Enzyme; Standing    Subacute cough  -     X-Ray Chest PA And Lateral; Future    Immunosuppressed status          Sarcoidosis w h/o pulmonary involvement  Recent cough   CXR  SOB stable  Labs including ACE - fax order to patient to do at [facility].  She is aware she will need to send resutls to me.  Consider readdition of steroids/cellcept if signs of active disease  RTC sooner if needed  Compromised immune system secondary to autoimmune disease and/or use of immunosuppressive drugs.  Monitor carefully for infections.  Advised patient to get immediate medical care if any infection arises.  Also advised strict adherence age-appropriate vaccinations and cancer screenings with PCP.  Return to clinic: 6mos w ACE and Reg 4 one week prior    Follow up in about 6 months (around 7/11/2023).    The patient understands, chooses and consents to this plan and accepts all the risks which include but are not limited to the risks mentioned above.     Disclaimer: This note was prepared using a voice recognition system and is likely to have sound alike errors within the text.

## 2023-01-18 ENCOUNTER — TELEPHONE (OUTPATIENT)
Dept: RHEUMATOLOGY | Facility: CLINIC | Age: 48
End: 2023-01-18
Payer: COMMERCIAL

## 2023-01-18 NOTE — TELEPHONE ENCOUNTER
Spoke with patient . She will do xray tomorrow evening . When she get to work tomorrow she will check on remaining labs.

## 2023-01-19 ENCOUNTER — APPOINTMENT (OUTPATIENT)
Dept: RADIOLOGY | Facility: HOSPITAL | Age: 48
End: 2023-01-19
Attending: PHYSICIAN ASSISTANT
Payer: COMMERCIAL

## 2023-01-19 DIAGNOSIS — R05.2 SUBACUTE COUGH: ICD-10-CM

## 2023-01-19 DIAGNOSIS — D86.0 PULMONARY SARCOIDOSIS: ICD-10-CM

## 2023-01-19 PROCEDURE — 71046 X-RAY EXAM CHEST 2 VIEWS: CPT | Mod: 26,,, | Performed by: RADIOLOGY

## 2023-01-19 PROCEDURE — 71046 X-RAY EXAM CHEST 2 VIEWS: CPT | Mod: TC,PO

## 2023-01-19 PROCEDURE — 71046 XR CHEST PA AND LATERAL: ICD-10-PCS | Mod: 26,,, | Performed by: RADIOLOGY

## 2023-01-19 NOTE — TELEPHONE ENCOUNTER
Spoke with patient . ESR and CRP are on lab sheets that are scanned in . Page 1 at bottom of resulted labs is ESR-- page 2 at bottom is CRP. She will be getting x-ray today .

## 2023-01-23 NOTE — PROGRESS NOTES
New Patient Chronic Pain Note (Initial Visit)    Referring Physician: Maulik Link MD    PCP: Primary Doctor No    Chief Complaint:   Chief Complaint   Patient presents with    Low-back Pain    Leg Pain     Left         SUBJECTIVE:    Henrietta Parada is a 47 y.o. female with past medical history significant for sarcoidosis, hypertension who presents to the clinic for the evaluation of lower back and leg pain.  Ms. Parada reports pain has been present for several years without inciting accident injury or trauma.  Patient today reports pain which is intermittent which is rated an 8/10.  Pain is described as sharp and aching in nature.  Patient reports pain in a bandlike distribution in the lower back which radiates down the posterior aspect of the left lower extremity in S1-S2 distribution to the thigh.  Patient denies any right-sided radiculopathy.  Pain is exacerbated with prolonged standing and with ambulation.  Patient is able to ambulate approximately 5-10 minutes before requiring rest.  Pain is improved with rest.  Patient is currently taking gabapentin 300 mg twice daily without any meaningful relief.  She is also tried prior lumbar epidural steroid injection last [facility] without meaningful relief.  Patient has completed conventional physical therapy 2 years prior and continues physician directed physical therapy exercises at home without meaningful relief.  Patient has also failed to have improvement with Tylenol and Flexeril.  Patient denies associated weakness in the lower extremities, bowel or bladder incontinence or saddle anesthesia.    Patient denies night fever/night sweats, urinary incontinence, bowel incontinence, significant weight loss, significant motor weakness, and loss of sensations.      Pain Disability Index Review:     Last 3 PDI Scores 1/24/2023   Pain Disability Index (PDI) 48       Non-Pharmacologic Treatments:  Physical Therapy/Home Exercise:  yes  Ice/Heat:no  TENS: no  Acupuncture: no  Massage: no  Chiropractic: no    Other: no      Pain Medications:  - Opioids: Tylenol #3  - Adjuvant Medications: Celexa (Citalopram), Cyclobenzaprine (Flexeril), Nabumetone (Relafen), and Neurontin (Gabapentin)  - Anti-Coagulants: Aspirin    Pain Procedures:   -[facility]: [facility]    Past Medical History:   Diagnosis Date    Arthritis     Cutaneous sarcoidosis     Hypertension 6/19/2013    Pulmonary sarcoidosis     Sarcoidosis     Sarcoidosis of lung      Past Surgical History:   Procedure Laterality Date    mediastinoscopy       Review of patient's allergies indicates:   Allergen Reactions    Sulfa (sulfonamide antibiotics) Itching and Swelling       Current Outpatient Medications   Medication Sig    acetaminophen-codeine 300-30mg (TYLENOL #3) 300-30 mg Tab Take 0.5-1 tablets by mouth 2 (two) times daily as needed.    albuterol (VENTOLIN HFA) 90 mcg/actuation inhaler Inhale 2 puffs into the lungs every 6 (six) hours as needed for Shortness of Breath (before exercise). Rescue    amLODIPine (NORVASC) 10 MG tablet Take 10 mg by mouth once daily.    aspirin (ECOTRIN) 81 MG EC tablet Take 81 mg by mouth once daily.    atorvastatin (LIPITOR) 20 MG tablet Take 20 mg by mouth once daily.    carvediloL (COREG) 12.5 MG tablet Take 1 tablet by mouth 2 (two) times a day.    chlorhexidine (PERIDEX) 0.12 % solution 15 mLs 2 (two) times daily.    citalopram (CELEXA) 20 MG tablet Take 20 mg by mouth once daily.    cyclobenzaprine (FLEXERIL) 10 MG tablet Take 10 mg by mouth every 8 (eight) hours as needed.    DULoxetine (CYMBALTA) 20 MG capsule Take 1 capsule (20 mg total) by mouth once daily.    furosemide (LASIX) 20 MG tablet Take 20 mg by mouth every other day.    gabapentin (NEURONTIN) 300 MG capsule Take 300 mg by mouth 3 (three) times daily.    losartan-hydrochlorothiazide 100-25 mg (HYZAAR) 100-25 mg per tablet Take 1 tablet by mouth once daily.    meclizine (ANTIVERT) 25  "mg tablet Take 25 mg by mouth 3 (three) times daily as needed.    melatonin 1 mg Tab Take by mouth.     nabumetone (RELAFEN) 750 MG tablet Take 1 tablet (750 mg total) by mouth 2 (two) times daily.    pantoprazole (PROTONIX) 40 MG tablet Take 40 mg by mouth once daily.    spironolactone (ALDACTONE) 25 MG tablet Take 25 mg by mouth once daily.    TARINA FE 1-20 EQ, 28, 1 mg-20 mcg (21)/75 mg (7) per tablet Take 1 tablet by mouth once daily.     No current facility-administered medications for this visit.       Review of Systems     GENERAL:  No weight loss, malaise or fevers.  HEENT:   No recent changes in vision or hearing  NECK:  Negative for lumps, no difficulty with swallowing.  RESPIRATORY:  Negative for cough, wheezing or shortness of breath, patient denies any recent URI.  CARDIOVASCULAR:  Negative for chest pain or palpitations.  GI:  Negative for abdominal discomfort, blood in stools or black stools or change in bowel habits.  MUSCULOSKELETAL:  See HPI.  SKIN:  Negative for lesions, rash, and itching.  PSYCH:  No mood disorder or recent psychosocial stressors.   HEMATOLOGY/LYMPHOLOGY:  Negative for prolonged bleeding, bruising easily or swollen nodes.    NEURO:   No history of syncope, paralysis, seizures or tremors.  All other reviewed and negative other than HPI.    OBJECTIVE:    /80   Pulse 75   Resp 17   Ht 5' 11" (1.803 m)   Wt 120 kg (264 lb 8.8 oz)   BMI 36.90 kg/m²       Physical Exam    GENERAL: Well appearing, in no acute distress, alert and oriented x3.  PSYCH:  Mood and affect appropriate.  SKIN: Skin color, texture, turgor normal, no rashes or lesions.  HEAD/FACE:  Normocephalic, atraumatic. Cranial nerves grossly intact.    CV: RRR with palpation of the radial artery.  PULM: No evidence of respiratory difficulty, symmetric chest rise.  GI:  Soft and non-tender.    BACK: Straight leg raising in the sitting and supine positions is negative to radicular pain. No pain to palpation over " the facet joints of the lumbar spine or spinous processes. Normal range of motion without pain reproduction.  EXTREMITIES: Peripheral joint ROM is full and pain free without obvious instability or laxity in all four extremities. No deformities, edema, or skin discoloration. Good capillary refill.  MUSCULOSKELETAL: Able to stand on heels & toes.   Shoulder, hip, and knee provocative maneuvers are negative.   SIJ testing: LEFT  - TTP over SI joint: Present  - Arthur's/ Rick's: Positive    - Sacroiliac Distraction Test (anterior pressure): Positive  - Sacroiliac Compression Test (lateral pressure): Positive   - SacralThrust Test (posterior pressure): Positive  -tenderness to palpation over left greater trochanteric bursa    Facet loading test is negative bilaterally.   Bilateral upper and lower extremity strength is normal and symmetric.  No atrophy or tone abnormalities are noted.    RIGHT Lower extremity: Hip flexion 5/5, Hip Abduction 5/5, Hip Adduction 5/5, Knee extension 5/5, Knee flexion 5/5, Ankle dorsiflexion5/5, Extensor hallucis longus 5/5, Ankle plantarflexion 5/5  LEFT Lower extremity:  Hip flexion 5/5, Hip Abduction 5/5,Hip Adduction 5/5, Knee extension 5/5, Knee flexion 5/5, Ankle dorsiflexion 5/5, Extensor hallucis longus 5/5, Ankle plantarflexion 5/5  -Normal testing knee (patellar) jerk and ankle (achilles) jerk    NEURO: Bilateral upper and lower extremity coordination and muscle stretch reflexes are physiologic and symmetric. No loss of sensation is noted.  GAIT: normal.    Imaging:   None in system      ASSESSMENT: 47 y.o. year old female with lower back and left leg pain, consistent with     1. Lumbar radiculopathy  MRI Lumbar Spine Without Contrast    Ambulatory referral/consult to Physical/Occupational Therapy    MRI Lumbar Spine Without Contrast      2. Chronic bilateral low back pain with bilateral sciatica  Ambulatory referral/consult to Pain Clinic      3. Sacroiliitis  Ambulatory  referral/consult to Physical/Occupational Therapy    Case Request-RAD/Other Procedure Area: Left GT bursa injection, Left Sacroiliac Joint Injection with local      4. Greater trochanteric bursitis of left hip  Ambulatory referral/consult to Physical/Occupational Therapy    IR SI Joint Injection w/Imaging    Case Request-RAD/Other Procedure Area: Left GT bursa injection, Left Sacroiliac Joint Injection with local    IR Aspiration Injection Large Joint W FL            PLAN:   - Interventions:  Schedule for left-sided sacroiliac joint and greater trochanteric bursa injection to see if this helps with sacroiliitis and bursitis.. Explained the risks and benefits of the procedure in detail with the patient today in clinic along with alternative treatment options, and the patient elected to pursue the intervention at this time.      - Anticoagulation use: yes aspirin  Per ASA guidelines for primary prophylaxis, patient can continue aspirin for sacroiliac joint and greater trochanteric bursa injection.     reviewed and consistent with use  - Medications:  --We will start Cymbalta (duloxetine) 20 mg once daily.  I have discussed with the patient to increase this dose to 2 tablets, 40 mg in 1 week if well tolerated.  We have discussed potential common side effects of duloxetine including nausea, diarrhea/constipation, fatigue, drowsiness or dry mouth.  Patient expresses understanding.    -discontinued gabapentin secondary to inefficacy and daytime somnolence.      - Therapy:   We discussed initiating physical therapy to help manage the patient/s painful condition. The patient was counseled that muscle strengthening will improve the long term prognosis in regards to pain and may also help increase range of motion and mobility. They were told that one of the goals of physical therapy is that they learn how to do the exercises so that they can do them independently at home daily upon completion. The patient's questions were  answered and they were agreeable to this course. A referral for EXT physical therapy was provided to the patient.      - Imaging: Reviewed available imaging with patient and answered any questions they had regarding study.  Lumbar MRI to better evaluate pain. EXT        - Records: Obtain old records from outside physicians and imaging: [facility]    - Follow up visit: return to clinic in 4-6 weeks      The above plan and management options were discussed at length with patient. Patient is in agreement with the above and verbalized understanding.    - I discussed the goals of interventional chronic pain management with the patient on today's visit. We discussed a multimodal and systematic approach to pain.  This includes diagnostic and therapeutic injections, adjuvant pharmacologic treatment, physical therapy, and at times psychiatry.  I emphasized the importance of regular exercise, core strengthening and stretching, diet and weight loss as a cornerstone of long-term pain management.    - This condition does not require this patient to take time off of work, and the primary goal of our Pain Management services is to improve the patient's functional capacity.  - Patient Questions: Answered all of the patient's questions regarding diagnoses, therapy, treatment and next steps        Gene Singleton MD  Interventional Pain Management  [facility]    Disclaimer:  This note was prepared using voice recognition system and is likely to have sound alike errors that may have been overlooked even after proof reading.  Please call me with any questions

## 2023-01-24 ENCOUNTER — PATIENT MESSAGE (OUTPATIENT)
Dept: PAIN MEDICINE | Facility: CLINIC | Age: 48
End: 2023-01-24

## 2023-01-24 ENCOUNTER — OFFICE VISIT (OUTPATIENT)
Dept: PAIN MEDICINE | Facility: CLINIC | Age: 48
End: 2023-01-24
Payer: COMMERCIAL

## 2023-01-24 VITALS
HEART RATE: 75 BPM | DIASTOLIC BLOOD PRESSURE: 80 MMHG | HEIGHT: 71 IN | BODY MASS INDEX: 37.04 KG/M2 | SYSTOLIC BLOOD PRESSURE: 109 MMHG | WEIGHT: 264.56 LBS | RESPIRATION RATE: 17 BRPM

## 2023-01-24 DIAGNOSIS — M54.41 CHRONIC BILATERAL LOW BACK PAIN WITH BILATERAL SCIATICA: ICD-10-CM

## 2023-01-24 DIAGNOSIS — M46.1 SACROILIITIS: ICD-10-CM

## 2023-01-24 DIAGNOSIS — M54.42 CHRONIC BILATERAL LOW BACK PAIN WITH BILATERAL SCIATICA: ICD-10-CM

## 2023-01-24 DIAGNOSIS — M70.62 GREATER TROCHANTERIC BURSITIS OF LEFT HIP: ICD-10-CM

## 2023-01-24 DIAGNOSIS — G89.29 CHRONIC BILATERAL LOW BACK PAIN WITH BILATERAL SCIATICA: ICD-10-CM

## 2023-01-24 DIAGNOSIS — M54.16 LUMBAR RADICULOPATHY: Primary | ICD-10-CM

## 2023-01-24 PROCEDURE — 1159F PR MEDICATION LIST DOCUMENTED IN MEDICAL RECORD: ICD-10-PCS | Mod: CPTII,S$GLB,, | Performed by: ANESTHESIOLOGY

## 2023-01-24 PROCEDURE — 1160F PR REVIEW ALL MEDS BY PRESCRIBER/CLIN PHARMACIST DOCUMENTED: ICD-10-PCS | Mod: CPTII,S$GLB,, | Performed by: ANESTHESIOLOGY

## 2023-01-24 PROCEDURE — 3008F BODY MASS INDEX DOCD: CPT | Mod: CPTII,S$GLB,, | Performed by: ANESTHESIOLOGY

## 2023-01-24 PROCEDURE — 99204 OFFICE O/P NEW MOD 45 MIN: CPT | Mod: S$GLB,,, | Performed by: ANESTHESIOLOGY

## 2023-01-24 PROCEDURE — 1160F RVW MEDS BY RX/DR IN RCRD: CPT | Mod: CPTII,S$GLB,, | Performed by: ANESTHESIOLOGY

## 2023-01-24 PROCEDURE — 99999 PR PBB SHADOW E&M-EST. PATIENT-LVL V: CPT | Mod: PBBFAC,,, | Performed by: ANESTHESIOLOGY

## 2023-01-24 PROCEDURE — 3074F PR MOST RECENT SYSTOLIC BLOOD PRESSURE < 130 MM HG: ICD-10-PCS | Mod: CPTII,S$GLB,, | Performed by: ANESTHESIOLOGY

## 2023-01-24 PROCEDURE — 3074F SYST BP LT 130 MM HG: CPT | Mod: CPTII,S$GLB,, | Performed by: ANESTHESIOLOGY

## 2023-01-24 PROCEDURE — 3008F PR BODY MASS INDEX (BMI) DOCUMENTED: ICD-10-PCS | Mod: CPTII,S$GLB,, | Performed by: ANESTHESIOLOGY

## 2023-01-24 PROCEDURE — 3079F DIAST BP 80-89 MM HG: CPT | Mod: CPTII,S$GLB,, | Performed by: ANESTHESIOLOGY

## 2023-01-24 PROCEDURE — 3079F PR MOST RECENT DIASTOLIC BLOOD PRESSURE 80-89 MM HG: ICD-10-PCS | Mod: CPTII,S$GLB,, | Performed by: ANESTHESIOLOGY

## 2023-01-24 PROCEDURE — 99999 PR PBB SHADOW E&M-EST. PATIENT-LVL V: ICD-10-PCS | Mod: PBBFAC,,, | Performed by: ANESTHESIOLOGY

## 2023-01-24 PROCEDURE — 1159F MED LIST DOCD IN RCRD: CPT | Mod: CPTII,S$GLB,, | Performed by: ANESTHESIOLOGY

## 2023-01-24 PROCEDURE — 99204 PR OFFICE/OUTPT VISIT, NEW, LEVL IV, 45-59 MIN: ICD-10-PCS | Mod: S$GLB,,, | Performed by: ANESTHESIOLOGY

## 2023-01-24 RX ORDER — DULOXETIN HYDROCHLORIDE 20 MG/1
20 CAPSULE, DELAYED RELEASE ORAL DAILY
Qty: 30 CAPSULE | Refills: 1 | Status: SHIPPED | OUTPATIENT
Start: 2023-01-24 | End: 2023-02-23

## 2023-02-03 ENCOUNTER — PATIENT MESSAGE (OUTPATIENT)
Dept: PAIN MEDICINE | Facility: HOSPITAL | Age: 48
End: 2023-02-03
Payer: COMMERCIAL

## 2023-07-07 ENCOUNTER — PATIENT MESSAGE (OUTPATIENT)
Dept: INFECTIOUS DISEASES | Facility: CLINIC | Age: 48
End: 2023-07-07
Payer: COMMERCIAL

## 2023-07-11 ENCOUNTER — OFFICE VISIT (OUTPATIENT)
Dept: RHEUMATOLOGY | Facility: CLINIC | Age: 48
End: 2023-07-11
Payer: COMMERCIAL

## 2023-07-11 ENCOUNTER — PATIENT MESSAGE (OUTPATIENT)
Dept: RHEUMATOLOGY | Facility: CLINIC | Age: 48
End: 2023-07-11

## 2023-07-11 DIAGNOSIS — D84.9 IMMUNOSUPPRESSED STATUS: ICD-10-CM

## 2023-07-11 DIAGNOSIS — D86.0 PULMONARY SARCOIDOSIS: Primary | ICD-10-CM

## 2023-07-11 DIAGNOSIS — D86.9 SARCOIDOSIS: ICD-10-CM

## 2023-07-11 DIAGNOSIS — D86.3 CUTANEOUS SARCOIDOSIS: ICD-10-CM

## 2023-07-11 PROCEDURE — 99214 PR OFFICE/OUTPT VISIT, EST, LEVL IV, 30-39 MIN: ICD-10-PCS | Mod: 95,,, | Performed by: PHYSICIAN ASSISTANT

## 2023-07-11 PROCEDURE — 1160F PR REVIEW ALL MEDS BY PRESCRIBER/CLIN PHARMACIST DOCUMENTED: ICD-10-PCS | Mod: CPTII,95,, | Performed by: PHYSICIAN ASSISTANT

## 2023-07-11 PROCEDURE — 1159F PR MEDICATION LIST DOCUMENTED IN MEDICAL RECORD: ICD-10-PCS | Mod: CPTII,95,, | Performed by: PHYSICIAN ASSISTANT

## 2023-07-11 PROCEDURE — 1160F RVW MEDS BY RX/DR IN RCRD: CPT | Mod: CPTII,95,, | Performed by: PHYSICIAN ASSISTANT

## 2023-07-11 PROCEDURE — 99214 OFFICE O/P EST MOD 30 MIN: CPT | Mod: 95,,, | Performed by: PHYSICIAN ASSISTANT

## 2023-07-11 PROCEDURE — 1159F MED LIST DOCD IN RCRD: CPT | Mod: CPTII,95,, | Performed by: PHYSICIAN ASSISTANT

## 2023-07-11 NOTE — PROGRESS NOTES
The patient location is: work  The chief complaint leading to consultation is: Sarcoidosis    Visit type: audiovisual    Face to Face time with patient: 18 min  30 minutes of total time spent on the encounter, which includes face to face time and non-face to face time preparing to see the patient (eg, review of tests), Obtaining and/or reviewing separately obtained history, Documenting clinical information in the electronic or other health record, Independently interpreting results (not separately reported) and communicating results to the patient/family/caregiver, or Care coordination (not separately reported).         Each patient to whom he or she provides medical services by telemedicine is:  (1) informed of the relationship between the physician and patient and the respective role of any other health care provider with respect to management of the patient; and (2) notified that he or she may decline to receive medical services by telemedicine and may withdraw from such care at any time.    Notes:     Subjective:      Patient ID: Henrietta Parada is a 47 y.o. female.    Chief Complaint: No chief complaint on file.      HPI   Henrietta Parada  is a 47 y.o. female seen today for f/u on sarcoidosis with pulmonary and cutaneous involvement.  She has no cutaneous flares.  She relates history of shortness of breath which is normal for her.  It is stable and not progressive.  Also relates that it initially got worse after COVID in 2020. Saw Dr. Earl back in Aug w planned one year f/u.  Denies cough, CP.    Was previously treated with prednisone and then started CellCept as a steroid sparing agent.  They were able to stop taper off of the CellCept and she was considered to be in remission.  Labs under media tab 11/30/22 reviewed today.  No ace level.  Remaining labs ok    Overall feels stable but complains of widespread pain, mainly back and neck.  Recently saw Dr. Singleton who would like to do CSI SI joint and GTB.   Unfortunately it was cost prohibitive early this year.  She has had back and neck MRIs.  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      Patient denies fevers, chills, photosensitivity, eye pain, shortness of breath, chest pain, hematuria, blood in the stool, rash, sicca symptoms, raynauds, finger ulcerations.  Rheumatologic systems otherwise negative.    Serologies/Labs:  Previously elevated ACE - most recently WNL  Current Treatment:  Cymbalta 20 mg daily  Previous Treatment:   Steroids  cellcept  betamethosone cream      Current Outpatient Medications:     acetaminophen-codeine 300-30mg (TYLENOL #3) 300-30 mg Tab, Take 0.5-1 tablets by mouth 2 (two) times daily as needed., Disp: , Rfl:     albuterol (VENTOLIN HFA) 90 mcg/actuation inhaler, Inhale 2 puffs into the lungs every 6 (six) hours as needed for Shortness of Breath (before exercise). Rescue, Disp: 18 g, Rfl: 11    amLODIPine (NORVASC) 10 MG tablet, Take 10 mg by mouth once daily., Disp: , Rfl:     aspirin (ECOTRIN) 81 MG EC tablet, Take 81 mg by mouth once daily., Disp: , Rfl:     atorvastatin (LIPITOR) 20 MG tablet, Take 20 mg by mouth once daily., Disp: , Rfl:     carvediloL (COREG) 12.5 MG tablet, Take 1 tablet by mouth 2 (two) times a day., Disp: , Rfl:     chlorhexidine (PERIDEX) 0.12 % solution, 15 mLs 2 (two) times daily., Disp: , Rfl:     citalopram (CELEXA) 20 MG tablet, Take 20 mg by mouth once daily., Disp: , Rfl:     cyclobenzaprine (FLEXERIL) 10 MG tablet, Take 10 mg by mouth every 8 (eight) hours as needed., Disp: , Rfl:     DULoxetine (CYMBALTA) 20 MG capsule, Take 1 capsule (20 mg total) by mouth once daily., Disp: 30 capsule, Rfl: 1    furosemide (LASIX) 20 MG tablet, Take 20 mg by mouth every other day., Disp: , Rfl:     gabapentin (NEURONTIN) 300 MG capsule, Take 300 mg by mouth 3 (three) times daily., Disp: , Rfl:     losartan-hydrochlorothiazide 100-25 mg (HYZAAR) 100-25 mg per tablet, Take 1  tablet by mouth once daily., Disp: , Rfl:     meclizine (ANTIVERT) 25 mg tablet, Take 25 mg by mouth 3 (three) times daily as needed., Disp: , Rfl:     melatonin 1 mg Tab, Take by mouth. , Disp: , Rfl:     nabumetone (RELAFEN) 750 MG tablet, Take 1 tablet (750 mg total) by mouth 2 (two) times daily., Disp: 60 tablet, Rfl: 3    pantoprazole (PROTONIX) 40 MG tablet, Take 40 mg by mouth once daily., Disp: , Rfl:     spironolactone (ALDACTONE) 25 MG tablet, Take 25 mg by mouth once daily., Disp: , Rfl:     TARINA FE 1-20 EQ, 28, 1 mg-20 mcg (21)/75 mg (7) per tablet, Take 1 tablet by mouth once daily., Disp: , Rfl:     Past Medical History:   Diagnosis Date    Arthritis     Cutaneous sarcoidosis     Hypertension 6/19/2013    Pulmonary sarcoidosis     Sarcoidosis     Sarcoidosis of lung      Family History   Problem Relation Age of Onset    Sarcoidosis Brother      Social History     Socioeconomic History    Marital status:    Tobacco Use    Smoking status: Never    Smokeless tobacco: Never   Substance and Sexual Activity    Alcohol use: Yes     Comment: occasionally    Drug use: No    Sexual activity: Never     Birth control/protection: None     Review of patient's allergies indicates:   Allergen Reactions    Sulfa (sulfonamide antibiotics) Itching and Swelling       Objective:   There were no vitals taken for this visit.  Immunization History   Administered Date(s) Administered    COVID-19, MRNA, LN-S, PF (Pfizer) (Purple Cap) 07/27/2021, 08/24/2021    DTP 03/12/1975, 08/06/1975, 12/03/1975    Influenza 10/01/2018    Influenza - Quadrivalent - PF *Preferred* (6 months and older) 10/27/2021    Influenza A (H1N1) 2009 Monovalent - IM - PF 11/06/2009    MMR 08/22/1979    Measles / Rubella 10/01/1975    OPV 03/12/1975, 08/06/1975, 12/03/1975, 08/22/1979    Pneumococcal Conjugate - 13 Valent 11/12/2018    Pneumococcal Polysaccharide - 23 Valent 07/22/2020    Td (ADULT) 08/22/1979, 10/12/1999       Physical Exam    Constitutional: She is oriented to person, place, and time. No distress.   HENT:   Head: Normocephalic and atraumatic.   Pulmonary/Chest: Effort normal.   Musculoskeletal:      Cervical back: Normal range of motion.   Neurological: She is alert and oriented to person, place, and time.   Psychiatric: Mood normal.   100% fist formation    No results found for this or any previous visit (from the past 672 hour(s)).      Lab Results   Component Value Date    TBGOLDPLUS Negative 10/03/2018      Lab Results   Component Value Date    HEPAIGM Negative 10/03/2018    HEPBIGM Negative 10/03/2018    HEPBCAB Negative 12/20/2018    HEPCAB Negative 10/03/2018      I have personally reviewed chest x-ray from today and agree with the radiologist interpretation  X-Ray Chest PA And Lateral  Narrative: EXAM:  XR CHEST PA AND LATERAL    CLINICAL HISTORY: Sarcoidosis    COMPARISON: 07/11/2022    FINDINGS: No confluent airspace opacity or consolidation.  There is no evidence of pleural effusion, pneumothorax, or other acute pulmonary disease.  The cardiomediastinal silhouette is within normal limits.  No acute osseous abnormality is evident.  Impression:  No acute cardiopulmonary abnormality.    Finalized on: 1/19/2023 3:43 PM By:  Willem Coburn MD  [facility]# [id number]      2023-01-19 15:[id number]    [facility]        Assessment:     1. Pulmonary sarcoidosis    2. Immunosuppressed status    3. Sarcoidosis    4. Cutaneous sarcoidosis                Plan:     Diagnoses and all orders for this visit:    Pulmonary sarcoidosis    Immunosuppressed status    Sarcoidosis    Cutaneous sarcoidosis            Sarcoidosis w h/o pulmonary involvement  SOB stable  Pt states she had labs at [facility] recently.  I've asked her to get me those results.  Consider readdition of steroids/cellcept if signs of active disease  RTC sooner if needed  Compromised immune system secondary to autoimmune disease and/or use of immunosuppressive drugs.  Monitor carefully for  infections.  Advised patient to get immediate medical care if any infection arises.  Also advised strict adherence age-appropriate vaccinations and cancer screenings with PCP.  Return to clinic: 6mos w ACE and Reg 4 one week prior    30 minutes of total time spent on the encounter, which includes face to face time and non-face to face time preparing to see the patient (eg, review of tests), Obtaining and/or reviewing separately obtained history, Documenting clinical information in the electronic or other health record, Independently interpreting results (not separately reported) and communicating results to the patient/family/caregiver, or Care coordination (not separately reported).     Follow up in about 6 months (around 1/11/2024).    The patient understands, chooses and consents to this plan and accepts all the risks which include but are not limited to the risks mentioned above.     Disclaimer: This note was prepared using a voice recognition system and is likely to have sound alike errors within the text.

## 2023-07-13 ENCOUNTER — TELEPHONE (OUTPATIENT)
Dept: RHEUMATOLOGY | Facility: CLINIC | Age: 48
End: 2023-07-13
Payer: COMMERCIAL

## 2023-07-13 NOTE — TELEPHONE ENCOUNTER
Left message for patient to call [phone] to schedule an appointment 6 months  from 07/11/2023.  She needs labs from [facility]

## 2023-07-13 NOTE — TELEPHONE ENCOUNTER
----- Message from Karen Ba PA-C sent at 7/11/2023  4:26 PM CDT -----  Needs labs from [facility] (Reg 4 and ACE).  Also, f/u me in 6 mos w labs prior

## 2023-07-26 ENCOUNTER — PATIENT MESSAGE (OUTPATIENT)
Dept: PAIN MEDICINE | Facility: CLINIC | Age: 48
End: 2023-07-26
Payer: COMMERCIAL

## 2023-07-26 ENCOUNTER — PATIENT MESSAGE (OUTPATIENT)
Dept: RHEUMATOLOGY | Facility: CLINIC | Age: 48
End: 2023-07-26
Payer: COMMERCIAL

## 2024-07-24 ENCOUNTER — TELEPHONE (OUTPATIENT)
Dept: OPHTHALMOLOGY | Facility: CLINIC | Age: 49
End: 2024-07-24
Payer: COMMERCIAL

## 2024-07-24 NOTE — TELEPHONE ENCOUNTER
Called patient at 4:33pm advised her MGM is in the process of snf but we can get her scheduled with someone on his team, pt took the next avail with LYLE Portillo

## 2024-08-23 ENCOUNTER — OFFICE VISIT (OUTPATIENT)
Dept: OPHTHALMOLOGY | Facility: CLINIC | Age: 49
End: 2024-08-23
Payer: COMMERCIAL

## 2024-08-23 DIAGNOSIS — H26.9 CORTICAL CATARACT OF BOTH EYES: Primary | ICD-10-CM

## 2024-08-23 PROCEDURE — 99999 PR PBB SHADOW E&M-EST. PATIENT-LVL II: CPT | Mod: PBBFAC,,, | Performed by: OPHTHALMOLOGY

## 2024-08-23 NOTE — PROGRESS NOTES
HPI     Dry Eye     Additional comments: Patient states vision OU has changes since last   visit are not current;y using any gtts for treatment. Experiences teary   OU. Patien states she has slight decrease in vision OU Deferred DIL           Comments    · Annual Exam      Pt here for annual exam. No pain or discomfort. Pt states she has had   trouble seeing out of her glasses. She says her vision has always been   blurry through them.            HPI     Annual Exam     Additional comments: Pt here for annual exam. No pain or discomfort. Pt   states she has had trouble seeing out of her glasses. She says her vision   has always been blurry through them.             Comments    1. Sarcoidosis (No Ocular Involvement) since 5840-8288   2. Mild Dry eyes / Dry Mouth      Cellcept   Prednisone 20mg daily since late September   Was previously on 40mg   (Pt stopped Prednisone earlier this year.) 8/09/21.           Last edited by Yonatan Domingo MA on 9/1/2022  2:19 PM.                 Last edited by Ramya Taylor on 8/23/2024 10:59 AM.            Assessment /Plan     For exam results, see Encounter Report.    Cortical cataract of both eyes  Cataracts are present but not visually significant. Will continue to monitor. Mrx provided.     RTC 1 year sooner if needed